# Patient Record
Sex: FEMALE | Race: WHITE | HISPANIC OR LATINO | Employment: UNEMPLOYED | URBAN - METROPOLITAN AREA
[De-identification: names, ages, dates, MRNs, and addresses within clinical notes are randomized per-mention and may not be internally consistent; named-entity substitution may affect disease eponyms.]

---

## 2022-05-11 ENCOUNTER — HOSPITAL ENCOUNTER (EMERGENCY)
Facility: MEDICAL CENTER | Age: 22
End: 2022-05-12
Attending: OBSTETRICS & GYNECOLOGY | Admitting: OBSTETRICS & GYNECOLOGY
Payer: COMMERCIAL

## 2022-05-11 PROCEDURE — 302449 STATCHG TRIAGE ONLY (STATISTIC)

## 2022-05-12 VITALS
RESPIRATION RATE: 18 BRPM | HEART RATE: 88 BPM | BODY MASS INDEX: 39.01 KG/M2 | DIASTOLIC BLOOD PRESSURE: 60 MMHG | HEIGHT: 62 IN | WEIGHT: 212 LBS | TEMPERATURE: 36.4 F | OXYGEN SATURATION: 96 % | SYSTOLIC BLOOD PRESSURE: 97 MMHG

## 2022-05-12 LAB
APPEARANCE UR: CLEAR
COLOR UR AUTO: YELLOW
GLUCOSE UR QL STRIP.AUTO: NEGATIVE MG/DL
KETONES UR QL STRIP.AUTO: NEGATIVE MG/DL
LEUKOCYTE ESTERASE UR QL STRIP.AUTO: ABNORMAL
NITRITE UR QL STRIP.AUTO: NEGATIVE
PH UR STRIP.AUTO: 7 [PH] (ref 5–8)
PROT UR QL STRIP: NEGATIVE MG/DL
RBC UR QL AUTO: NEGATIVE
SP GR UR STRIP.AUTO: 1.02 (ref 1–1.03)

## 2022-05-12 PROCEDURE — 81002 URINALYSIS NONAUTO W/O SCOPE: CPT

## 2022-05-12 NOTE — PROGRESS NOTES
0000: pt to labor and delivery, pt states she had an appointment with dr. Cruz this morning.  Pt states her bp was high, but does not recall the number.  Pt had labs drawn after the appointment.  Pt states she continues to have high blood pressures at home with her machine but cannot recall the numbers.  Pt states she has had a headache since she moved to [place] (11 days).  Pt denies vaginal bleeding or leaking. Pt reports fetal movement.  Vss.  ua done, no protein.     0030: call to dr. Valentin, report given. Orders to discharge home with preeclampsia precautions.  Pt discharged in stable condition.

## 2022-05-12 NOTE — DISCHARGE INSTRUCTIONS
Preeclampsia and Eclampsia  Preeclampsia is a serious condition that may develop during pregnancy. This condition causes high blood pressure and increased protein in your urine along with other symptoms, such as headaches and vision changes. These symptoms may develop as the condition gets worse. Preeclampsia may occur at 20 weeks of pregnancy or later.  Diagnosing and treating preeclampsia early is very important. If not treated early, it can cause serious problems for you and your baby. One problem it can lead to is eclampsia. Eclampsia is a condition that causes muscle jerking or shaking (convulsions or seizures) and other serious problems for the mother. During pregnancy, delivering your baby may be the best treatment for preeclampsia or eclampsia. For most women, preeclampsia and eclampsia symptoms go away after giving birth.  In rare cases, a woman may develop preeclampsia after giving birth (postpartum preeclampsia). This usually occurs within 48 hours after childbirth but may occur up to 6 weeks after giving birth.  What are the causes?  The cause of preeclampsia is not known.  What increases the risk?  The following risk factors make you more likely to develop preeclampsia:  Being pregnant for the first time.  Having had preeclampsia during a past pregnancy.  Having a family history of preeclampsia.  Having high blood pressure.  Being pregnant with more than one baby.  Being 35 or older.  Being -American.  Having kidney disease or diabetes.  Having medical conditions such as lupus or blood diseases.  Being very overweight (obese).  What are the signs or symptoms?  The most common symptoms are:  Severe headaches.  Vision problems, such as blurred or double vision.  Abdominal pain, especially upper abdominal pain.  Other symptoms that may develop as the condition gets worse include:  Sudden weight gain.  Sudden swelling of the hands, face, legs, and feet.  Severe nausea and vomiting.  Numbness in the  face, arms, legs, and feet.  Dizziness.  Urinating less than usual.  Slurred speech.  Convulsions or seizures.  How is this diagnosed?  There are no screening tests for preeclampsia. Your health care provider will ask you about symptoms and check for signs of preeclampsia during your prenatal visits. You may also have tests that include:  Checking your blood pressure.  Urine tests to check for protein. Your health care provider will check for this at every prenatal visit.  Blood tests.  Monitoring your baby's heart rate.  Ultrasound.  How is this treated?  You and your health care provider will determine the treatment approach that is best for you. Treatment may include:  Having more frequent prenatal exams to check for signs of preeclampsia, if you have an increased risk for preeclampsia.  Medicine to lower your blood pressure.  Staying in the hospital, if your condition is severe. There, treatment will focus on controlling your blood pressure and the amount of fluids in your body (fluid retention).  Taking medicine (magnesium sulfate) to prevent seizures. This may be given as an injection or through an IV.  Taking a low-dose aspirin during your pregnancy.  Delivering your baby early. You may have your labor started with medicine (induced), or you may have a  delivery.  Follow these instructions at home:  Eating and drinking    Drink enough fluid to keep your urine pale yellow.  Avoid caffeine.  Lifestyle  Do not use any products that contain nicotine or tobacco, such as cigarettes and e-cigarettes. If you need help quitting, ask your health care provider.  Do not use alcohol or drugs.  Avoid stress as much as possible. Rest and get plenty of sleep.  General instructions  Take over-the-counter and prescription medicines only as told by your health care provider.  When lying down, lie on your left side. This keeps pressure off your major blood vessels.  When sitting or lying down, raise (elevate) your feet.  Try putting some pillows underneath your lower legs.  Exercise regularly. Ask your health care provider what kinds of exercise are best for you.  Keep all follow-up and prenatal visits as told by your health care provider. This is important.  How is this prevented?  There is no known way of preventing preeclampsia or eclampsia from developing. However, to lower your risk of complications and detect problems early:  Get regular prenatal care. Your health care provider may be able to diagnose and treat the condition early.  Maintain a healthy weight. Ask your health care provider for help managing weight gain during pregnancy.  Work with your health care provider to manage any long-term (chronic) health conditions you have, such as diabetes or kidney problems.  You may have tests of your blood pressure and kidney function after giving birth.  Your health care provider may have you take low-dose aspirin during your next pregnancy.  Contact a health care provider if:  You have symptoms that your health care provider told you may require more treatment or monitoring, such as:  Headaches.  Nausea or vomiting.  Abdominal pain.  Dizziness.  Light-headedness.  Get help right away if:  You have severe:  Abdominal pain.  Headaches that do not get better.  Dizziness.  Vision problems.  Confusion.  Nausea or vomiting.  You have any of the following:  A seizure.  Sudden, rapid weight gain.  Sudden swelling in your hands, ankles, or face.  Trouble moving any part of your body.  Numbness in any part of your body.  Trouble speaking.  Abnormal bleeding.  You faint.  Summary  Preeclampsia is a serious condition that may develop during pregnancy.  This condition causes high blood pressure and increased protein in your urine along with other symptoms, such as headaches and vision changes.  Diagnosing and treating preeclampsia early is very important. If not treated early, it can cause serious problems for you and your baby.  Get help  right away if you have symptoms that your health care provider told you to watch for.  This information is not intended to replace advice given to you by your health care provider. Make sure you discuss any questions you have with your health care provider.  Document Released: 12/15/2001 Document Revised: 08/20/2019 Document Reviewed: 07/24/2017  Elsevier Patient Education © 2020 [employer] Inc[employer]

## 2022-07-05 ENCOUNTER — APPOINTMENT (OUTPATIENT)
Dept: RADIOLOGY | Facility: MEDICAL CENTER | Age: 22
End: 2022-07-05
Attending: OBSTETRICS & GYNECOLOGY
Payer: COMMERCIAL

## 2022-07-05 ENCOUNTER — HOSPITAL ENCOUNTER (EMERGENCY)
Facility: MEDICAL CENTER | Age: 22
End: 2022-07-05
Attending: OBSTETRICS & GYNECOLOGY | Admitting: OBSTETRICS & GYNECOLOGY
Payer: COMMERCIAL

## 2022-07-05 VITALS
SYSTOLIC BLOOD PRESSURE: 104 MMHG | DIASTOLIC BLOOD PRESSURE: 63 MMHG | HEART RATE: 107 BPM | OXYGEN SATURATION: 97 % | WEIGHT: 212 LBS | HEIGHT: 62 IN | BODY MASS INDEX: 39.01 KG/M2 | TEMPERATURE: 98.8 F

## 2022-07-05 LAB
ABO GROUP BLD: NORMAL
ALBUMIN SERPL BCP-MCNC: 3.3 G/DL (ref 3.2–4.9)
ALBUMIN/GLOB SERPL: 1 G/DL
ALP SERPL-CCNC: 114 U/L (ref 30–99)
ALT SERPL-CCNC: 14 U/L (ref 2–50)
ANION GAP SERPL CALC-SCNC: 11 MMOL/L (ref 7–16)
APPEARANCE UR: CLEAR
APTT PPP: 24.1 SEC (ref 24.7–36)
AST SERPL-CCNC: 13 U/L (ref 12–45)
BASOPHILS # BLD AUTO: 0.3 % (ref 0–1.8)
BASOPHILS # BLD: 0.03 K/UL (ref 0–0.12)
BILIRUB SERPL-MCNC: 0.2 MG/DL (ref 0.1–1.5)
BILIRUB UR QL STRIP.AUTO: NEGATIVE
BLD GP AB SCN SERPL QL: NORMAL
BUN SERPL-MCNC: 6 MG/DL (ref 8–22)
CALCIUM SERPL-MCNC: 8.8 MG/DL (ref 8.5–10.5)
CHLORIDE SERPL-SCNC: 104 MMOL/L (ref 96–112)
CO2 SERPL-SCNC: 19 MMOL/L (ref 20–33)
COLOR UR: YELLOW
CREAT SERPL-MCNC: 0.35 MG/DL (ref 0.5–1.4)
EOSINOPHIL # BLD AUTO: 0.21 K/UL (ref 0–0.51)
EOSINOPHIL NFR BLD: 2.2 % (ref 0–6.9)
ERYTHROCYTE [DISTWIDTH] IN BLOOD BY AUTOMATED COUNT: 38.5 FL (ref 35.9–50)
FIBRINOGEN PPP-MCNC: 569 MG/DL (ref 215–460)
FLUAV RNA SPEC QL NAA+PROBE: NEGATIVE
FLUBV RNA SPEC QL NAA+PROBE: NEGATIVE
GFR SERPLBLD CREATININE-BSD FMLA CKD-EPI: 148 ML/MIN/1.73 M 2
GLOBULIN SER CALC-MCNC: 3.4 G/DL (ref 1.9–3.5)
GLUCOSE SERPL-MCNC: 95 MG/DL (ref 65–99)
GLUCOSE UR STRIP.AUTO-MCNC: NEGATIVE MG/DL
HCT VFR BLD AUTO: 32.7 % (ref 37–47)
HGB BLD-MCNC: 11.2 G/DL (ref 12–16)
IMM GRANULOCYTES # BLD AUTO: 0.02 K/UL (ref 0–0.11)
IMM GRANULOCYTES NFR BLD AUTO: 0.2 % (ref 0–0.9)
INR PPP: 0.96 (ref 0.87–1.13)
KETONES UR STRIP.AUTO-MCNC: 40 MG/DL
LACTATE SERPL-SCNC: 1.3 MMOL/L (ref 0.5–2)
LEUKOCYTE ESTERASE UR QL STRIP.AUTO: ABNORMAL
LYMPHOCYTES # BLD AUTO: 2.01 K/UL (ref 1–4.8)
LYMPHOCYTES NFR BLD: 21.4 % (ref 22–41)
MCH RBC QN AUTO: 29.2 PG (ref 27–33)
MCHC RBC AUTO-ENTMCNC: 34.3 G/DL (ref 33.6–35)
MCV RBC AUTO: 85.2 FL (ref 81.4–97.8)
MICRO URNS: ABNORMAL
MONOCYTES # BLD AUTO: 0.63 K/UL (ref 0–0.85)
MONOCYTES NFR BLD AUTO: 6.7 % (ref 0–13.4)
NEUTROPHILS # BLD AUTO: 6.5 K/UL (ref 2–7.15)
NEUTROPHILS NFR BLD: 69.2 % (ref 44–72)
NITRITE UR QL STRIP.AUTO: NEGATIVE
NRBC # BLD AUTO: 0 K/UL
NRBC BLD-RTO: 0 /100 WBC
PH UR STRIP.AUTO: 6 [PH] (ref 5–8)
PLATELET # BLD AUTO: 350 K/UL (ref 164–446)
PMV BLD AUTO: 10.5 FL (ref 9–12.9)
POTASSIUM SERPL-SCNC: 3.4 MMOL/L (ref 3.6–5.5)
PROT SERPL-MCNC: 6.7 G/DL (ref 6–8.2)
PROT UR QL STRIP: NEGATIVE MG/DL
PROTHROMBIN TIME: 12.7 SEC (ref 12–14.6)
RBC # BLD AUTO: 3.84 M/UL (ref 4.2–5.4)
RBC # URNS HPF: NORMAL /HPF
RBC UR QL AUTO: NEGATIVE
RH BLD: NORMAL
RSV RNA SPEC QL NAA+PROBE: POSITIVE
SARS-COV-2 RNA RESP QL NAA+PROBE: DETECTED
SODIUM SERPL-SCNC: 134 MMOL/L (ref 135–145)
SP GR UR STRIP.AUTO: 1.02
SPECIMEN SOURCE: ABNORMAL
UROBILINOGEN UR STRIP.AUTO-MCNC: 1 MG/DL
WBC # BLD AUTO: 9.4 K/UL (ref 4.8–10.8)
WBC #/AREA URNS HPF: NORMAL /HPF

## 2022-07-05 PROCEDURE — 85384 FIBRINOGEN ACTIVITY: CPT

## 2022-07-05 PROCEDURE — 81001 URINALYSIS AUTO W/SCOPE: CPT

## 2022-07-05 PROCEDURE — 86900 BLOOD TYPING SEROLOGIC ABO: CPT

## 2022-07-05 PROCEDURE — 0241U HCHG SARS-COV-2 COVID-19 NFCT DS RESP RNA 4 TRGT MIC: CPT

## 2022-07-05 PROCEDURE — 36415 COLL VENOUS BLD VENIPUNCTURE: CPT

## 2022-07-05 PROCEDURE — C9803 HOPD COVID-19 SPEC COLLECT: HCPCS | Performed by: OBSTETRICS & GYNECOLOGY

## 2022-07-05 PROCEDURE — 83605 ASSAY OF LACTIC ACID: CPT

## 2022-07-05 PROCEDURE — 85610 PROTHROMBIN TIME: CPT

## 2022-07-05 PROCEDURE — 700105 HCHG RX REV CODE 258: Performed by: OBSTETRICS & GYNECOLOGY

## 2022-07-05 PROCEDURE — 86850 RBC ANTIBODY SCREEN: CPT

## 2022-07-05 PROCEDURE — 85730 THROMBOPLASTIN TIME PARTIAL: CPT

## 2022-07-05 PROCEDURE — 85025 COMPLETE CBC W/AUTO DIFF WBC: CPT

## 2022-07-05 PROCEDURE — 86901 BLOOD TYPING SEROLOGIC RH(D): CPT

## 2022-07-05 PROCEDURE — 80053 COMPREHEN METABOLIC PANEL: CPT

## 2022-07-05 PROCEDURE — 76819 FETAL BIOPHYS PROFIL W/O NST: CPT

## 2022-07-05 PROCEDURE — 302449 STATCHG TRIAGE ONLY (STATISTIC)

## 2022-07-05 PROCEDURE — 59025 FETAL NON-STRESS TEST: CPT

## 2022-07-05 RX ORDER — SODIUM CHLORIDE, SODIUM LACTATE, POTASSIUM CHLORIDE, CALCIUM CHLORIDE 600; 310; 30; 20 MG/100ML; MG/100ML; MG/100ML; MG/100ML
INJECTION, SOLUTION INTRAVENOUS CONTINUOUS
Status: DISCONTINUED | OUTPATIENT
Start: 2022-07-05 | End: 2022-07-06 | Stop reason: HOSPADM

## 2022-07-05 RX ADMIN — SODIUM CHLORIDE, POTASSIUM CHLORIDE, SODIUM LACTATE AND CALCIUM CHLORIDE: 600; 310; 30; 20 INJECTION, SOLUTION INTRAVENOUS at 18:00

## 2022-07-06 NOTE — PROGRESS NOTES
1900 Report received from DANITZA Son. POC discussed, questions answered.    2200 Per Dr. Lane, if BPP is 8/8 discharge home.    2256 Discharge instructions given including COVID positive and PTL precautions, and when to return to the hospital, Pt verbalizes understanding at this time. All questions answered.    2300 Pt ambulated off the unit with FOB and personal belongings in place.

## 2022-07-06 NOTE — ED PROVIDER NOTES
"Department of Obstetrics and Gynecology  Labor and Delivery History and Physical    Date of Admission: 2022     ID: 22 y.o.  with IUP at 31w6d, CINDY 2022    Primary OB: Zachery Cruz M.D.     Attending OB: Elsa Lane M.D.     CC: feels sick    HPI: Velvet Mota is a 22 y.o.  at 31 weeks and 6 days who presents to labor and delivery with a 3-day history of worsening upper respiratory symptoms.  She has a severe sore throat but is able to tolerate p.o.  She reports a cough and some chest pressure her cough is productive but nonbloody.  She reports nasal congestion.  On day one of her illness she had nausea and diarrhea though both of these symptoms have resolved.  She has had limited p.o. intake today.  She has had shaking chills but has not taken her temperature at home.  She denies contractions loss of fluid vaginal bleeding and reports that the baby is moving well.  She presents today with the father the baby who is feeling well she has no prior history of COVID infection    She is not vaccinated for COVID    ROS: 10 systems reviewed and negative except as noted above.    Obstetric History    - EAb  G2 - Current pregnancy      Past Medical History  Surgical History   Denies   Left toenail removal      Gynecologic History  Social History   Regular menses prior to pregnancy  Denies Hx of abnormal pap smears.  Denies Hx of STIs Tobacco: Denies  EtOH: Denies  Street Drugs: Prior history of marijuana use  Engaged. FOB is Jayme who is at bedside       Medications  Allergies   No current facility-administered medications on file prior to encounter.     No current outpatient medications on file prior to encounter.    Rocephin [ceftriaxone]       O: /63   Pulse (!) 117   Temp 37.1 °C (98.8 °F) (Oral)   Ht 1.575 m (5' 2\")   Wt 96.2 kg (212 lb)   SpO2 95%   BMI 38.78 kg/m²     Gen: NAD, AAO  CV: RRR, no m/r/g  Lungs: CTAB  Abd: Gravid, NTTP,Cephalic by " [place], No rebound or guarding  Ext: NTTP, no edema, 2+DPP  Pelvic: deferred    FHT:  150 with moderate LTV. +accels. Deceleration noted to 120 bpm x 3 min  [place]: occasional     Labs:   Admission on 05/11/2022, Discharged on 05/12/2022   Component Date Value Ref Range Status   • POC Color 05/12/2022 Yellow   Final   • POC Appearance 05/12/2022 Clear   Final   • POC Glucose 05/12/2022 Negative  Negative mg/dL Final   • POC Ketones 05/12/2022 Negative  Negative mg/dL Final   • POC Specific Gravity 05/12/2022 1.020  1.005 - 1.030 Final   • POC Blood 05/12/2022 Negative  Negative Final   • POC Urine PH 05/12/2022 7.0  5.0 - 8.0 Final   • POC Protein 05/12/2022 Negative  Negative mg/dL Final   • POC Nitrites 05/12/2022 Negative  Negative Final   • POC Leukocyte Esterase 05/12/2022 Trace (A) Negative Final        Latest Reference Range & Units 07/05/22 17:55   WBC 4.8 - 10.8 K/uL 9.4   RBC 4.20 - 5.40 M/uL 3.84 (L)   Hemoglobin 12.0 - 16.0 g/dL 11.2 (L)   Hematocrit 37.0 - 47.0 % 32.7 (L)   MCV 81.4 - 97.8 fL 85.2   MCH 27.0 - 33.0 pg 29.2   MCHC 33.6 - 35.0 g/dL 34.3   RDW 35.9 - 50.0 fL 38.5   Platelet Count 164 - 446 K/uL 350   MPV 9.0 - 12.9 fL 10.5   Neutrophils-Polys 44.00 - 72.00 % 69.20   Neutrophils (Absolute) 2.00 - 7.15 K/uL 6.50   Lymphocytes 22.00 - 41.00 % 21.40 (L)   Lymphs (Absolute) 1.00 - 4.80 K/uL 2.01   Monocytes 0.00 - 13.40 % 6.70   Monos (Absolute) 0.00 - 0.85 K/uL 0.63   Eosinophils 0.00 - 6.90 % 2.20   Eos (Absolute) 0.00 - 0.51 K/uL 0.21   Basophils 0.00 - 1.80 % 0.30   Baso (Absolute) 0.00 - 0.12 K/uL 0.03   Immature Granulocytes 0.00 - 0.90 % 0.20   Immature Granulocytes (abs) 0.00 - 0.11 K/uL 0.02   Nucleated RBC /100 WBC 0.00   NRBC (Absolute) K/uL 0.00   Sodium 135 - 145 mmol/L 134 (L)   Potassium 3.6 - 5.5 mmol/L 3.4 (L)   Chloride 96 - 112 mmol/L 104   Co2 20 - 33 mmol/L 19 (L)   Anion Gap 7.0 - 16.0  11.0   Glucose 65 - 99 mg/dL 95   Bun 8 - 22 mg/dL 6 (L)   Creatinine 0.50 - 1.40 mg/dL  0.35 (L)   GFR (CKD-EPI) >60 mL/min/1.73 m 2 148   Calcium 8.5 - 10.5 mg/dL 8.8   AST(SGOT) 12 - 45 U/L 13   ALT(SGPT) 2 - 50 U/L 14   Alkaline Phosphatase 30 - 99 U/L 114 (H)   Total Bilirubin 0.1 - 1.5 mg/dL 0.2   Albumin 3.2 - 4.9 g/dL 3.3   Total Protein 6.0 - 8.2 g/dL 6.7   Globulin 1.9 - 3.5 g/dL 3.4   A-G Ratio g/dL 1.0   Lactic Acid 0.5 - 2.0 mmol/L 1.3   (L): Data is abnormally low  (H): Data is abnormally high         Latest Reference Range & Units 07/05/22 17:55   Influenza virus A RNA Negative  Negative   Influenza virus B, PCR Negative  Negative   RSV, PCR Negative  POSITIVE !   SARS-CoV-2 by PCR  DETECTED !!   SARS-CoV-2 Source  NP Swab   !: Data is abnormal  !!: Data is critical    Imaging:   HISTORY/REASON FOR EXAM:  Abnormal fetal heart rate or rhythm; ISO COVID +/ RSV+        TECHNIQUE/EXAM DESCRIPTION:  Ultrasound for biophysical profile.        COMPARISON:  None     FINDINGS:  Single intrauterine gestation is identified which has a heart rate of 142 bpm.     Amniotic fluid volume is 15.79 cm.           Biophysical profile score is 8  out of 8 (movement 2, tone 2, breathing 2, fluid 2).     IMPRESSION:        1. Normal biophysical profile ultrasound.       A/P: Velvet Mota is a 22 y.o. G G2, P0 at 31 weeks and 6 days here for upper respiratory symptoms currently diagnosed with RSV and COVID  1.  RSV and COVID  -No evidence of hypotension sepsis or preeclampsia  -Oxygen saturation is normal on room air  -Maternal tachycardia has resolved upon admission following IV hydration  -Supportive measures for RSV discussed  -PACs lipid treatment offered and declined  -COVID vaccination recommended  -Start aspirin 81 mg p.o. daily for the remainder of pregnancy  2. Fetal well being   -Reactive NST  -BPP 10/10  -[facility] reviewed    F/u with [place] as planned on 7/11. Covid precautions given. FM precautions given. Isolation guidelines reviewed.     Elsa Lane M.D.,  7/5/2022, 8:14 PM

## 2022-07-06 NOTE — PROGRESS NOTES
"1659- G1 at 31-6 presents with 3 day history of feeling unwell with, increasing in severity today: vomiting (1st day only), chills and sweating, productive cough, sore throat, chest heaviess, SOB, and her hands \"smelling like onions.\" Taken to [facility]. Accompanied by FOB who has no symptoms. Has not been vaccinated for Covid. Denies ctx, leaking, bleeding. Reports +FM. Denies any health or pregnancy problems.    TOCO/EFM placed. Pt appears pale, productive cough noted. Vital signs as noted. O2 sats 93-97%. P 120s-150. Report to Dr. Lane. Orders received.   1755- IV started.   1800- Nasal swab obtained.   1850- Pt reports feeling a little better after IVF bolus. Still feels chest heaviness. Pulse decreasing in rate. No lung secretions noted on ausculation.   1900- Notified from Lab of positive Covid and RSV result. Pt and MD notified. Report to DANITZA Morejon.     "

## 2022-07-06 NOTE — DISCHARGE INSTRUCTIONS
Pre-term Labor (<37 weeks):  Call your physician or return to the hospital if:  You have painless regular contractions more than 4 in one hour.  Your water breaks (remember time and color).  You have menstrual-like cramps, a low dull backache or pressure in your pelvis or back.  Your baby does not move enough to complete the daily kick count (10 movements in 2 hours).  Your baby moves much less often than on the days before or you have not felt your baby move all day.  Please review the MEDICATION LIST section of your AFTER VISIT SUMMARY document.  Take your medication as prescribed    Pregnancy and COVID-19  Coronavirus disease, also called COVID-19, is an infection of the lungs and airways (respiratory tract). It is unclear at this time if pregnancy makes it more likely for you to get COVID-19, or what effects the infection may have on your unborn baby. However, pregnancy causes changes to your heart, lungs, and the body's disease-fighting system (immune system). Some of these changes make it more likely for you to get sick and have more serious illness. Therefore, it is important for you to take precautions in order to protect yourself and your unborn baby.  Work with your health care team to develop a plan to protect yourself from all infections, including COVID-19. This is one way for you to stay healthy during your pregnancy and to keep your baby healthy as well.  How does this affect me?  If you get COVID-19, there is a risk that you may:  Get a respiratory illness that can lead to pneumonia.  Give birth to your baby before 37 weeks of pregnancy (premature birth).  If you have or may have COVID-19, your health care provider may recommend special precautions around your pregnancy. This may affect how you:  Receive care before delivery (prenatal care). How you visit your health care provider may change. Tests and scans may need to be performed differently.  Receive care during labor and delivery. This may  affect your birth plan, including who may be with you during labor and delivery.  Receive care after you deliver your baby (postpartum care). You may stay longer in the hospital, and in a special room. Your baby may also need to stay away from you.  Feed your baby after he or she is born.  Pregnancy can be an especially stressful time because of the changes in your body and the preparation involved in becoming a parent. In addition, you may be feeling especially fearful, anxious, or stressed because of COVID-19 and how it is affecting you.  How does this affect my baby?  It is not known whether a mother will transmit the virus to her unborn baby. There is a risk that if you get COVID-19:  The virus that causes COVID-19 can pass to your baby.  You may have premature birth. Your baby may require more medical care if this happens.  What can I do to lower my risk?    There is no vaccine to help prevent COVID-19. However, there are actions that you can take to protect yourself and others from this virus.  Cleaning and personal hygiene  Wash your hands often with soap and water for at least 20 seconds. If soap and water are not available, use alcohol-based hand .  Avoid touching your mouth, face, eyes, or nose.  Clean and disinfect objects and surfaces that are frequently touched every day. This may include:  Counters and tables.  Doorknobs and light switches.  Sinks and faucets.  Electronics such as phones, remote controls, keyboards, computers, and tablets.  Stay away from others  Stay away from people who are sick, if possible.  Avoid social gatherings and travel.  Stay home as much as possible.  Follow these instructions:  Breastfeeding  It is not known if the virus that causes COVID-19 can pass through breast milk to your baby. You should make a plan for feeding your infant with your family and your health care team.  If you have or may have COVID-19, your health care provider may recommend that you take  precautions while breastfeeding, such as:  Washing your hands before feeding your baby.  Wearing a mask while feeding your baby.  Pumping or expressing breast milk to feed to your baby. If possible, ask someone in your household who is not sick to feed your baby the expressed breast milk.  Wash your hands before touching pump parts.  Wash and disinfect all pump parts after expressing milk. Follow the 's instructions to clean and disinfect all pump parts.  General instructions  If you think you have a COVID-19 infection, contact your health care provider right away. Tell your health care provider that you think you may have a COVID-19 infection.  Follow your health care provider's instructions on taking medicines. Some medicines may be unsafe to take during pregnancy.  Cover your mouth and nose by wearing a mask or other cloth covering over your face when you go out in public.  Find ways to manage stress. These may include:  Using relaxation techniques like meditation and deep breathing.  Getting regular exercise. Most women can continue their usual exercise routine during pregnancy. Ask your health care provider what activities are safe for you.  Seeking support from family, friends, or spiritual resources. If you cannot be together in person, you can still connect by phone calls, texts, video calls, or online messaging.  Spending time doing relaxing activities that you enjoy, like listening to music or reading a good book.  Ask for help if you have counseling or nutritional needs during pregnancy. Your health care provider can offer advice or refer you to resources or specialists who can help you with various needs.  Keep all follow-up visits as told by your health care provider. This is important.  Where to find more information  Centers for Disease Control and Prevention (CDC): www.cdc.gov/coronavirus/2019-ncov/  World Health Organization (WHO):  www.who.int/news-room/q-a-detail/y-n-ow-covid-19-pregnancy-childbirth-and-breastfeeding  American College of Obstetricians and Gynecologists (ACOG): www.acog.org/patient-resources/faqs/pregnancy/coronavirus-pregnancy-and-breastfeeding  Questions to ask your health care team  What should I do if I have COVID-19 symptoms?  How will COVID-19 affect my prenatal care visits, tests and scans, labor and delivery, and postpartum care?  Should I plan to breastfeed my baby?  Where can I find mental health resources?  Where can I find support if I have financial concerns?  Contact a health care provider if:  You have signs and symptoms of infection, including a fever or cough. Tell your health care team that you think you may have a COVID-19 infection.  You have strong emotions, such as sadness or anxiety.  You feel unsafe in your home and need help finding a safe place to live.  You have bloody or watery vaginal discharge or vaginal bleeding.  Get help right away if:  You have signs or symptoms of labor before 37 weeks of pregnancy. These include:  Contractions that are 5 minutes or less apart, or that increase in frequency, intensity, or length.  Sudden, sharp pain in the abdomen or in the lower back.  A gush or trickle of fluid from your vagina.  You have difficulty breathing.  You have chest pain.  These symptoms may represent a serious problem that is an emergency. Do not wait to see if the symptoms will go away. Get medical help right away. Call your local emergency services (911 in the [place] Do not drive yourself to the hospital.  Summary  Coronavirus disease, also called COVID-19, is an infection of the lungs and airways (respiratory tract). It is unclear at this time if pregnancy makes you more susceptible to COVID-19 and what effects it may have on unborn babies.  It is important to take precautions to protect yourself and your developing baby. This includes washing your hands often, avoiding touching your mouth,  face, eyes, or nose, avoiding social gatherings and travel, and staying away from people who are sick.  If you think you have a COVID-19 infection, contact your health care provider right away. Tell your health care provider that you think you may have a COVID-19 infection.  If you have or may have COVID-19, your health care provider may recommend special precautions during your pregnancy, labor and delivery, and after your baby is born.  This information is not intended to replace advice given to you by your health care provider. Make sure you discuss any questions you have with your health care provider.  Document Released: 04/14/2020 Document Revised: 04/14/2020 Document Reviewed: 04/14/2020  Elsevier Patient Education © 2020 [employer]

## 2022-08-23 ENCOUNTER — HOSPITAL ENCOUNTER (EMERGENCY)
Facility: MEDICAL CENTER | Age: 22
End: 2022-08-23
Attending: OBSTETRICS & GYNECOLOGY | Admitting: OBSTETRICS & GYNECOLOGY
Payer: COMMERCIAL

## 2022-08-23 VITALS
HEART RATE: 90 BPM | BODY MASS INDEX: 40.48 KG/M2 | WEIGHT: 220 LBS | HEIGHT: 62 IN | TEMPERATURE: 97.3 F | DIASTOLIC BLOOD PRESSURE: 82 MMHG | RESPIRATION RATE: 17 BRPM | SYSTOLIC BLOOD PRESSURE: 126 MMHG

## 2022-08-23 PROCEDURE — 302449 STATCHG TRIAGE ONLY (STATISTIC)

## 2022-08-23 PROCEDURE — 59025 FETAL NON-STRESS TEST: CPT

## 2022-08-23 ASSESSMENT — FIBROSIS 4 INDEX: FIB4 SCORE: 0.22

## 2022-08-24 NOTE — DISCHARGE INSTRUCTIONS
Fetal Movement Counts  Patient Name: ________________________________________________ Patient Due Date: ____________________  What is a fetal movement count?    A fetal movement count is the number of times that you feel your baby move during a certain amount of time. This may also be called a fetal kick count. A fetal movement count is recommended for every pregnant woman. You may be asked to start counting fetal movements as early as week 28 of your pregnancy.  Pay attention to when your baby is most active. You may notice your baby's sleep and wake cycles. You may also notice things that make your baby move more. You should do a fetal movement count:  When your baby is normally most active.  At the same time each day.  A good time to count movements is while you are resting, after having something to eat and drink.  How do I count fetal movements?  Find a quiet, comfortable area. Sit, or lie down on your side.  Write down the date, the start time and stop time, and the number of movements that you felt between those two times. Take this information with you to your health care visits.  For 2 hours, count kicks, flutters, swishes, rolls, and jabs. You should feel at least 10 movements during 2 hours.  You may stop counting after you have felt 10 movements.  If you do not feel 10 movements in 2 hours, have something to eat and drink. Then, keep resting and counting for 1 hour. If you feel at least 4 movements during that hour, you may stop counting.  Contact a health care provider if:  You feel fewer than 4 movements in 2 hours.  Your baby is not moving like he or she usually does.  Date: ____________ Start time: ____________ Stop time: ____________ Movements: ____________  Date: ____________ Start time: ____________ Stop time: ____________ Movements: ____________  Date: ____________ Start time: ____________ Stop time: ____________ Movements: ____________  Date: ____________ Start time: ____________ Stop time:  ____________ Movements: ____________  Date: ____________ Start time: ____________ Stop time: ____________ Movements: ____________  Date: ____________ Start time: ____________ Stop time: ____________ Movements: ____________  Date: ____________ Start time: ____________ Stop time: ____________ Movements: ____________  Date: ____________ Start time: ____________ Stop time: ____________ Movements: ____________  Date: ____________ Start time: ____________ Stop time: ____________ Movements: ____________  This information is not intended to replace advice given to you by your health care provider. Make sure you discuss any questions you have with your health care provider.  Document Released: 01/17/2008 Document Revised: 01/07/2020 Document Reviewed: 01/26/2017  Elsevier Patient Education © 2020 [employer]

## 2022-08-24 NOTE — PROGRESS NOTES
2315: 38.6 weeks G1 pt presents with complaint of not feeling baby move since last night. Pt denies ctx, LOF or VB. VSS, EFMx2 applied. Moderate variability with accels noted on tracing. RN provides reassurance and instructions on proper movement counts. Pt given ice water.    2352: GRABIEL dickens/Dr. Sotelo via phone regarding patient presentation and complaint. RNST noted. Verbal order received for discharge.

## 2022-08-26 ENCOUNTER — HOSPITAL ENCOUNTER (INPATIENT)
Facility: MEDICAL CENTER | Age: 22
LOS: 5 days | End: 2022-08-31
Attending: OBSTETRICS & GYNECOLOGY | Admitting: OBSTETRICS & GYNECOLOGY
Payer: COMMERCIAL

## 2022-08-26 ENCOUNTER — APPOINTMENT (OUTPATIENT)
Dept: OBGYN | Facility: MEDICAL CENTER | Age: 22
End: 2022-08-26
Attending: OBSTETRICS & GYNECOLOGY
Payer: COMMERCIAL

## 2022-08-26 DIAGNOSIS — G89.18 POSTOPERATIVE PAIN: ICD-10-CM

## 2022-08-26 DIAGNOSIS — Z91.89 BREASTFEEDING PROBLEM: ICD-10-CM

## 2022-08-26 LAB
BASOPHILS # BLD AUTO: 0.3 % (ref 0–1.8)
BASOPHILS # BLD: 0.02 K/UL (ref 0–0.12)
EOSINOPHIL # BLD AUTO: 0.01 K/UL (ref 0–0.51)
EOSINOPHIL NFR BLD: 0.1 % (ref 0–6.9)
ERYTHROCYTE [DISTWIDTH] IN BLOOD BY AUTOMATED COUNT: 40.1 FL (ref 35.9–50)
HCT VFR BLD AUTO: 31.4 % (ref 37–47)
HGB BLD-MCNC: 10.5 G/DL (ref 12–16)
HOLDING TUBE BB 8507: NORMAL
IMM GRANULOCYTES # BLD AUTO: 0.05 K/UL (ref 0–0.11)
IMM GRANULOCYTES NFR BLD AUTO: 0.6 % (ref 0–0.9)
LYMPHOCYTES # BLD AUTO: 2.44 K/UL (ref 1–4.8)
LYMPHOCYTES NFR BLD: 31.1 % (ref 22–41)
MCH RBC QN AUTO: 27.5 PG (ref 27–33)
MCHC RBC AUTO-ENTMCNC: 33.4 G/DL (ref 33.6–35)
MCV RBC AUTO: 82.2 FL (ref 81.4–97.8)
MONOCYTES # BLD AUTO: 0.47 K/UL (ref 0–0.85)
MONOCYTES NFR BLD AUTO: 6 % (ref 0–13.4)
NEUTROPHILS # BLD AUTO: 4.85 K/UL (ref 2–7.15)
NEUTROPHILS NFR BLD: 61.9 % (ref 44–72)
NRBC # BLD AUTO: 0 K/UL
NRBC BLD-RTO: 0 /100 WBC
PLATELET # BLD AUTO: 341 K/UL (ref 164–446)
PMV BLD AUTO: 12.5 FL (ref 9–12.9)
RBC # BLD AUTO: 3.82 M/UL (ref 4.2–5.4)
WBC # BLD AUTO: 7.8 K/UL (ref 4.8–10.8)

## 2022-08-26 PROCEDURE — A9270 NON-COVERED ITEM OR SERVICE: HCPCS | Performed by: OBSTETRICS & GYNECOLOGY

## 2022-08-26 PROCEDURE — 700102 HCHG RX REV CODE 250 W/ 637 OVERRIDE(OP): Performed by: OBSTETRICS & GYNECOLOGY

## 2022-08-26 PROCEDURE — 36415 COLL VENOUS BLD VENIPUNCTURE: CPT

## 2022-08-26 PROCEDURE — 770002 HCHG ROOM/CARE - OB PRIVATE (112)

## 2022-08-26 PROCEDURE — 85025 COMPLETE CBC W/AUTO DIFF WBC: CPT

## 2022-08-26 RX ORDER — SODIUM CHLORIDE, SODIUM LACTATE, POTASSIUM CHLORIDE, CALCIUM CHLORIDE 600; 310; 30; 20 MG/100ML; MG/100ML; MG/100ML; MG/100ML
INJECTION, SOLUTION INTRAVENOUS CONTINUOUS
Status: DISCONTINUED | OUTPATIENT
Start: 2022-08-26 | End: 2022-08-27

## 2022-08-26 RX ORDER — ACETAMINOPHEN 500 MG
1000 TABLET ORAL
Status: COMPLETED | OUTPATIENT
Start: 2022-08-26 | End: 2022-08-27

## 2022-08-26 RX ORDER — TERBUTALINE SULFATE 1 MG/ML
0.25 INJECTION, SOLUTION SUBCUTANEOUS
Status: DISCONTINUED | OUTPATIENT
Start: 2022-08-26 | End: 2022-08-28 | Stop reason: HOSPADM

## 2022-08-26 RX ORDER — IBUPROFEN 800 MG/1
800 TABLET ORAL
Status: DISCONTINUED | OUTPATIENT
Start: 2022-08-26 | End: 2022-08-28 | Stop reason: HOSPADM

## 2022-08-26 RX ORDER — OXYTOCIN 10 [USP'U]/ML
10 INJECTION, SOLUTION INTRAMUSCULAR; INTRAVENOUS
Status: DISCONTINUED | OUTPATIENT
Start: 2022-08-26 | End: 2022-08-28 | Stop reason: HOSPADM

## 2022-08-26 RX ADMIN — MISOPROSTOL 25 MCG: 100 TABLET ORAL at 23:32

## 2022-08-26 ASSESSMENT — PATIENT HEALTH QUESTIONNAIRE - PHQ9
SUM OF ALL RESPONSES TO PHQ9 QUESTIONS 1 AND 2: 0
2. FEELING DOWN, DEPRESSED, IRRITABLE, OR HOPELESS: NOT AT ALL
1. LITTLE INTEREST OR PLEASURE IN DOING THINGS: NOT AT ALL

## 2022-08-26 ASSESSMENT — FIBROSIS 4 INDEX: FIB4 SCORE: 0.22

## 2022-08-26 ASSESSMENT — LIFESTYLE VARIABLES: EVER_SMOKED: NEVER

## 2022-08-26 NOTE — H&P
Labor and Delivery History and Physical    CC: Here for induction of labor    HPI:Velvet Mota is a 22-year-old G1, P0 who presents today at 39 weeks and 2 days for term, elective induction of labor.  She initiated her medical care in [place] and transferred to myself at 24 weeks and 0 days.  She had noninvasive prenatal testing that demonstrated a chromosome structure 46XX.  She had an anatomic scan at 18 weeks in [place] that was normal, and it was repeated here at 27 weeks and growth was appropriate.    All other systems were reviewed and were negative.    PMH: Depression  PSH: Removal of a nail on left toe  OB HX: This is her first pregnancy  Gyn Hx: Denies sexually transmitted infection including herpes simplex virus.  Denies abnormal Pap smears.  Last Pap smear was negative for intraepithelial lesions or malignancies in 2022 with her gynecologist in [place]  SH: Denies tobacco, alcohol, or illicit drug use  Meds: Prenatal vitamins  Allergies: Rocephin      Physical Exam  Gen: NAD  Abd: gravid, non tender  Ext: no edema    SVE:1-2/80-2    Laboratory Evaluation  ABO AB+  GBS negative  GTT: 107  Rubella: Immune  HIV: Neg  RPR: NR  Hep sAg: neg    Assessment:   Term intrauterine pregnancy at 39 weeks 2 days      Plan:  Admit to labor and delivery for induction of labor with misoprostol.  She may have an epidural on demand.  Anticipate vaginal delivery.    Zachery Cruz M.D.     Patient : Allison Tate Age: 65 year old Sex: female   MRN: [id number] Encounter Date: 2/15/2021      History     Chief Complaint   Patient presents with   • Fall   • Knee Pain     HPI  About an hour prior to arrival this 65-year-old lady was in the bathroom and after having a bowel movement she was trying to get up and then she had a syncopal episode.  She does not recall the event she does not know how long she was on the floor but she thinks she struck the right parietal area of the head against the floor and she also injured her low right knee.  She had knee replacement.  She denies any headache she does not know how long the loss of consciousness was.  She denies any focal neurological deficits associated with the episode.  She denies any neck or cervical spine pain no thoracolumbar back pain.  She is not aware of any chest pain chest tightness palpitations nausea vomiting abdominal pain diarrhea genitourinary symptoms she had no systemic symptoms fever chills.  Right knee is diffusely tender pain is dull worse with any movement or palpation not associated with gross deformity or swelling.  She has some right proximal thigh bruising which she thinks is “old” she is on Plavix.  No Known Allergies    Current Discharge Medication List      Prior to Admission Medications    Details   traZODone (DESYREL) 100 MG tablet Take 1 tablet by mouth nightly.  Qty: 90 tablet, Refills: 1      atorvastatin (LIPITOR) 40 MG tablet Take 1 tablet by mouth daily.  Qty: 90 tablet, Refills: 3      escitalopram (LEXAPRO) 20 MG tablet Take 1 tablet by mouth daily.  Qty: 90 tablet, Refills: 3      clopidogrel (PLAVIX) 75 MG tablet Take 1 tablet by mouth daily.  Qty: 90 tablet, Refills: 3      lamotrigine (LAMICTAL) 200 MG tablet Take 1 tablet by mouth 3 times daily.  Qty: 270 tablet, Refills: 3      potassium CHLORIDE (KLOR-CON M) 20 MEQ ana maría ER tablet TAKE 1 TABLET BY MOUTH  TWICE DAILY  Qty: 180 tablet, Refills: 0       Glucos-Chondroit-Hyaluron-MSM (GLUCOSAMINE CHONDROITIN JOINT PO)       loperamide (IMODIUM) 2 MG capsule 1-2 tablets 3 times daily as needed for diarrhea.  Qty: 180 capsule, Refills: 11      calcium carbonate-vitamin D (CALTRATE+D) 600-400 MG-UNIT per tablet Take 1 tablet by mouth daily.      MULTIPLE VITAMIN PO Take 1 tablet by mouth daily.      ascorbic acid (VITAMIN C) 1000 MG tablet Take 1,000 mg by mouth daily.      Inulin (FIBER CHOICE PO) Take  by mouth daily.      vitamin B-12 (CYANOCOBALAMIN) 100 MCG tablet Take 100 mcg by mouth daily.      Omega-3 Fatty Acids (FISH OIL) 1200 MG capsule Take 1,200 mg by mouth daily.             Past Medical History:   Diagnosis Date   • Arm fracture, right 02/26/2019   • Carotid artery disease (CMS/HCC)    • Cerebrovascular disease     x3   • CTS (carpal tunnel syndrome)    • Depression    • Epilepsy, psychomotor (CMS/[facility])     r/t strokes   • Essential (primary) hypertension    • Hyperlipidemia    • IFG (impaired fasting glucose)    • Mood disorder (CMS/[facility])    • Personal history of transient ischemic attack (TIA) and cerebral infarction without residual deficit    • Wrist fracture, closed, right, sequela 01/05/2020       Past Surgical History:   Procedure Laterality Date   • BLADDER SURGERY      x2   • CAROTID ARTERY ANGIOPLASTY     • CARPAL TUNNEL RELEASE Left 08/20/2019   • COLONOSCOPY  10.21.14    Dr. Montes De Oca @ [place]; unremarkable   • COLONOSCOPY DIAGNOSTIC  11/26/2014    Colonoscopy, Dx   • GASTRIC BYPASS     • JOINT REPLACEMENT      Knee Replacement.   • REOPER, CAROTID ENDARTEC>1 MON     • TONSILLECTOMY AND ADENOIDECTOMY         Family History   Problem Relation Age of Onset   • Heart disease Father    • Cancer, Lung Father 69   • Heart disease Sister    • Diabetes Sister 46   • Myocardial Infarction Brother    • Patient is unaware of any medical problems Mother    • Patient is unaware of any medical problems Brother    • NEGATIVE FAMILY HX OF Other         Colon  Cancer, IBD       Social History     Tobacco Use   • Smoking status: Current Every Day Smoker     Packs/day: 0.25     Years: 35.00     Pack years: 8.75     Types: Cigarettes     Start date: 1970   • Smokeless tobacco: Never Used   Substance Use Topics   • Alcohol use: Yes     Alcohol/week: 1.0 - 2.0 standard drinks     Types: 1 - 2 Glasses of wine per week     Frequency: 2-4 times a month     Drinks per session: 1 or 2   • Drug use: No       E-cigarette/Vaping   • E-Cigarette/Vaping Use Never Used    • Passive Exposure No    • Counseling Given No      E-Cigarette/Vaping Substances & Devices       Review of Systems   All other systems reviewed and are negative.      Physical Exam     ED Triage Vitals [02/15/21 2229]   ED Triage Vitals Group      Temp 98.4 °F (36.9 °C)      Heart Rate (!) 48      Resp 16      /64      SpO2 97 %      EtCO2 mmHg       Height 5' 9\" (1.753 m)      Weight 153 lb 7 oz (69.6 kg)      Weight Scale Used Scale in bed      BMI (Calculated) 22.66      IBW/kg (Calculated) 66.2       Physical Exam   Constitutional: She is oriented to person, place, and time. No distress.   HENT:   Head: Normocephalic.   Nose: Nose normal.   Mouth/Throat: Oropharynx is clear and moist. No oropharyngeal exudate.   There is no scalp tenderness bruises abrasions or lacerations no depressed skull fractures no Rowan sign no hemotympanum no CSF leak no epistaxis no intraoral lesions or facial tenderness overall normocephalic atraumatic.  Patient pointed to the right posterior parietal area but this area is not tender.   Eyes: Pupils are equal, round, and reactive to light. Right eye exhibits no discharge. Left eye exhibits no discharge. No scleral icterus.   Neck: Neck supple. No JVD present.   Cardiovascular: Normal rate, regular rhythm, normal heart sounds and intact distal pulses. Exam reveals no gallop and no friction rub.   No murmur heard.  Pulmonary/Chest: Effort normal and breath sounds normal. No  respiratory distress. She has no wheezes. She has no rales. She exhibits no tenderness.   Abdominal: Bowel sounds are normal. She exhibits no distension. There is no abdominal tenderness. There is no rebound and no guarding.   Musculoskeletal:         General: Tenderness present. No deformity.        Legs:       Comments: Right knee is diffusely tender but no gross deformities neurovascular examination distally is normal there is right proximal thigh bruising which the patient reports is old otherwise musculoskeletal screening revealed no deformities bruises restriction of movement lacerations abrasions or contusions.   Lymphadenopathy:     She has no cervical adenopathy.   Neurological: She is alert and oriented to person, place, and time. No cranial nerve deficit.   Skin: She is not diaphoretic.   Nursing note and vitals reviewed.      ED Course     Procedures    Lab Results     Results for orders placed or performed during the hospital encounter of 02/15/21   Comprehensive Metabolic Panel   Result Value Ref Range    Fasting Status      Sodium 140 135 - 145 mmol/L    Potassium 4.3 3.4 - 5.1 mmol/L    Chloride 107 98 - 107 mmol/L    Carbon Dioxide 20 (L) 21 - 32 mmol/L    Anion Gap 17 10 - 20 mmol/L    Glucose 100 (H) 65 - 99 mg/dL    BUN 26 (H) 6 - 20 mg/dL    Creatinine 0.58 0.51 - 0.95 mg/dL    Glomerular Filtration Rate >90 >90 mL/min/1.73m2    BUN/ Creatinine Ratio 45 (H) 7 - 25    Calcium 8.9 8.4 - 10.2 mg/dL    Bilirubin, Total 0.4 0.2 - 1.0 mg/dL    GOT/AST 19 <=37 Units/L    GPT/ALT 24 <64 Units/L    Alkaline Phosphatase 79 45 - 117 Units/L    Albumin 3.8 3.6 - 5.1 g/dL    Protein, Total 6.8 6.4 - 8.2 g/dL    Globulin 3.0 2.0 - 4.0 g/dL    A/G Ratio 1.3 1.0 - 2.4   Troponin I Ultra Sensitive   Result Value Ref Range    Troponin I, Ultra Sensitive <0.02 <=0.04 ng/mL   Prothrombin Time   Result Value Ref Range    Prothrombin Time 10.3 9.7 - 11.8 sec    INR 1.0 <=5.0   Partial Thromboplastin Time   Result  Value Ref Range    PTT 23 22 - 30 sec   Magnesium   Result Value Ref Range    Magnesium 1.8 1.7 - 2.4 mg/dL   Urinalysis & Reflex Microscopy With Culture If Indicated   Result Value Ref Range    COLOR, URINALYSIS Yellow     APPEARANCE, URINALYSIS Hazy     GLUCOSE, URINALYSIS Negative Negative mg/dL    BILIRUBIN, URINALYSIS Negative Negative    KETONES, URINALYSIS 15   (A) Negative mg/dL    SPECIFIC GRAVITY, URINALYSIS >1.030 (H) 1.005 - 1.030    OCCULT BLOOD, URINALYSIS Large (A) Negative    PH, URINALYSIS 5.0 5.0 - 7.0    PROTEIN, URINALYSIS Negative Negative mg/dL    UROBILINOGEN, URINALYSIS 0.2 0.2, 1.0 mg/dL    NITRITE, URINALYSIS Negative Negative    LEUKOCYTE ESTERASE, URINALYSIS Negative Negative    SQUAMOUS EPITHELIAL, URINALYSIS 6 to 10 (A) None Seen, 1 to 5 /hpf    ERYTHROCYTES, URINALYSIS 11 to 25 (A) None Seen, 1 to 2 /hpf    LEUKOCYTES, URINALYSIS None Seen None Seen, 1 to 5 /hpf    BACTERIA, URINALYSIS Few (A) None Seen /hpf    HYALINE CASTS, URINALYSIS 6 to 10 (A) None Seen, 1 to 5 /lpf   CBC with Automated Differential (performable only)   Result Value Ref Range    WBC 7.5 4.2 - 11.0 K/mcL    RBC 4.07 4.00 - 5.20 mil/mcL    HGB 13.1 12.0 - 15.5 g/dL    HCT 40.5 36.0 - 46.5 %    MCV 99.5 78.0 - 100.0 fl    MCH 32.2 26.0 - 34.0 pg    MCHC 32.3 32.0 - 36.5 g/dL    RDW-CV 12.9 11.0 - 15.0 %    RDW-SD 47.2 39.0 - 50.0 fL     140 - 450 K/mcL    NRBC 0 <=0 /100 WBC    Neutrophil, Percent 75 %    Lymphocytes, Percent 14 %    Mono, Percent 7 %    Eosinophils, Percent 2 %    Basophils, Percent 1 %    Immature Granulocytes 1 %    Absolute Neutrophils 5.6 1.8 - 7.7 K/mcL    Absolute Lymphocytes 1.1 1.0 - 4.0 K/mcL    Absolute Monocytes 0.6 0.3 - 0.9 K/mcL    Absolute Eosinophils  0.2 0.0 - 0.5 K/mcL    Absolute Basophils 0.1 0.0 - 0.3 K/mcL    Absolute Immmature Granulocytes 0.0 0.0 - 0.2 K/mcL       EKG Results     Diagnosis syncope.  Sinus bradycardia ventricular rate 49 no ectopy no pauses.  P.r.  interval 176 milliseconds.  QRS duration 82 milliseconds.   milliseconds.  There is poor R-wave progression in septal leads but no acute ST-T abnormalities.  Impression abnormal EKG.    EKG tracing interpreted by ED physician    Radiology Results     Imaging Results          XR Chest AP or PA (Final result)  Result time 02/15/21 23:16:41    Final result                 Impression:    FINDINGS/IMPRESSION:    CHEST:    No pleural effusions, pneumothorax or focal consolidation. Cardiac  mediastinal silhouette, pulmonary vasculature and visualized bony thorax is  unremarkable.        Knee:    Uncomplicated total knee arthroplasty.    No acute fracture or malalignment.    Moderate suprapatellar joint effusion. Atherosclerotic vascular  calcifications.                     Narrative:    XR KNEE 4+ VW RIGHT, XR CHEST AP OR PA    Indication: fall, prostetic knee    COMPARISON:  August 16, 2019.                               XR Knee 4+ View Right (Final result)  Result time 02/15/21 23:16:41    Final result                 Impression:    FINDINGS/IMPRESSION:    CHEST:    No pleural effusions, pneumothorax or focal consolidation. Cardiac  mediastinal silhouette, pulmonary vasculature and visualized bony thorax is  unremarkable.        Knee:    Uncomplicated total knee arthroplasty.    No acute fracture or malalignment.    Moderate suprapatellar joint effusion. Atherosclerotic vascular  calcifications.                     Narrative:    XR KNEE 4+ VW RIGHT, XR CHEST AP OR PA    Indication: fall, prostetic knee    COMPARISON:  August 16, 2019.                                ED Medication Orders (From admission, onward)    Ordered Start     Status Ordering Provider    02/15/21 2343 02/15/21 2344  aspirin chewable 324 mg  ONCE      Acknowledged ARTURO BARGER    02/15/21 2327 02/15/21 2328  ketorolac injection 30 mg  ONCE      Last MAR action: Given ARTURO BARGER               [facility]   Multiple diagnosis were considered  including , but not limited to; cardiogenic syncope, CVA, orthostatic syncope, vasovagal syncope, cardiac arrhythmia due to myocardial infarction. The patient's risk and complexity of the case does NOT allow for safe discharge home and require further IN HOSPITAL care and diagnostic work up in attempt to prevent life threatening deterioration. The initial ER treatment and diagnostic work up will need be re-evaluated and modified as needed during hospitalization to lower the risk of of deterioration and/or life threatening complications.The patient/guardian/surrogate was made aware of the above and expressed consent.     Clinical Impression     ED Diagnosis   1. Syncope, unspecified syncope type     2. Bradycardia by electrocardiogram     3. Dehydration     4. Sprain of right knee, unspecified ligament, initial encounter         Disposition        Admit 2/15/2021 11:46 PM  Telemetry Bed?: Yes  Patient Class: Observation [4]  Level of Care: Acute [1]  Admission Diagnosis: Syncope [936004]  Admitting Physician: NICKY ARAUJO [[id number]]  Is this a telephone or verbal order?: This is a telephone order from the admitting physician                     Jt Toth MD  02/15/21 1233

## 2022-08-27 ENCOUNTER — ANESTHESIA (OUTPATIENT)
Dept: OBGYN | Facility: MEDICAL CENTER | Age: 22
End: 2022-08-27
Payer: COMMERCIAL

## 2022-08-27 ENCOUNTER — ANESTHESIA EVENT (OUTPATIENT)
Dept: OBGYN | Facility: MEDICAL CENTER | Age: 22
End: 2022-08-27
Payer: COMMERCIAL

## 2022-08-27 LAB
ALBUMIN SERPL BCP-MCNC: 3.5 G/DL (ref 3.2–4.9)
ALBUMIN/GLOB SERPL: 1 G/DL
ALP SERPL-CCNC: 172 U/L (ref 30–99)
ALT SERPL-CCNC: 7 U/L (ref 2–50)
ANION GAP SERPL CALC-SCNC: 13 MMOL/L (ref 7–16)
AST SERPL-CCNC: 15 U/L (ref 12–45)
BILIRUB SERPL-MCNC: <0.2 MG/DL (ref 0.1–1.5)
BUN SERPL-MCNC: 11 MG/DL (ref 8–22)
CALCIUM SERPL-MCNC: 9 MG/DL (ref 8.5–10.5)
CHLORIDE SERPL-SCNC: 106 MMOL/L (ref 96–112)
CO2 SERPL-SCNC: 18 MMOL/L (ref 20–33)
CREAT SERPL-MCNC: 0.61 MG/DL (ref 0.5–1.4)
CREAT UR-MCNC: 110.46 MG/DL
GFR SERPLBLD CREATININE-BSD FMLA CKD-EPI: 129 ML/MIN/1.73 M 2
GLOBULIN SER CALC-MCNC: 3.4 G/DL (ref 1.9–3.5)
GLUCOSE SERPL-MCNC: 80 MG/DL (ref 65–99)
POTASSIUM SERPL-SCNC: 4 MMOL/L (ref 3.6–5.5)
PROT SERPL-MCNC: 6.9 G/DL (ref 6–8.2)
PROT UR-MCNC: 41 MG/DL (ref 0–15)
PROT/CREAT UR: 371 MG/G (ref 10–107)
SODIUM SERPL-SCNC: 137 MMOL/L (ref 135–145)
URATE SERPL-MCNC: 6.9 MG/DL (ref 1.9–8.2)

## 2022-08-27 PROCEDURE — 700105 HCHG RX REV CODE 258: Performed by: OBSTETRICS & GYNECOLOGY

## 2022-08-27 PROCEDURE — 700111 HCHG RX REV CODE 636 W/ 250 OVERRIDE (IP): Performed by: OBSTETRICS & GYNECOLOGY

## 2022-08-27 PROCEDURE — 160002 HCHG RECOVERY MINUTES (STAT): Performed by: OBSTETRICS & GYNECOLOGY

## 2022-08-27 PROCEDURE — A9270 NON-COVERED ITEM OR SERVICE: HCPCS | Performed by: OBSTETRICS & GYNECOLOGY

## 2022-08-27 PROCEDURE — 59514 CESAREAN DELIVERY ONLY: CPT | Mod: 80 | Performed by: OBSTETRICS & GYNECOLOGY

## 2022-08-27 PROCEDURE — 10H07YZ INSERTION OF OTHER DEVICE INTO PRODUCTS OF CONCEPTION, VIA NATURAL OR ARTIFICIAL OPENING: ICD-10-PCS | Performed by: OBSTETRICS & GYNECOLOGY

## 2022-08-27 PROCEDURE — 700105 HCHG RX REV CODE 258: Performed by: ANESTHESIOLOGY

## 2022-08-27 PROCEDURE — 700111 HCHG RX REV CODE 636 W/ 250 OVERRIDE (IP)

## 2022-08-27 PROCEDURE — 01967 NEURAXL LBR ANES VAG DLVR: CPT | Performed by: ANESTHESIOLOGY

## 2022-08-27 PROCEDURE — 700111 HCHG RX REV CODE 636 W/ 250 OVERRIDE (IP): Performed by: ANESTHESIOLOGY

## 2022-08-27 PROCEDURE — 160041 HCHG SURGERY MINUTES - EA ADDL 1 MIN LEVEL 4: Performed by: OBSTETRICS & GYNECOLOGY

## 2022-08-27 PROCEDURE — 303615 HCHG EPIDURAL/SPINAL ANESTHESIA FOR LABOR

## 2022-08-27 PROCEDURE — 160048 HCHG OR STATISTICAL LEVEL 1-5: Performed by: OBSTETRICS & GYNECOLOGY

## 2022-08-27 PROCEDURE — 84156 ASSAY OF PROTEIN URINE: CPT

## 2022-08-27 PROCEDURE — 700102 HCHG RX REV CODE 250 W/ 637 OVERRIDE(OP): Performed by: OBSTETRICS & GYNECOLOGY

## 2022-08-27 PROCEDURE — 160009 HCHG ANES TIME/MIN: Performed by: OBSTETRICS & GYNECOLOGY

## 2022-08-27 PROCEDURE — 160035 HCHG PACU - 1ST 60 MINS PHASE I: Performed by: OBSTETRICS & GYNECOLOGY

## 2022-08-27 PROCEDURE — 3E033VJ INTRODUCTION OF OTHER HORMONE INTO PERIPHERAL VEIN, PERCUTANEOUS APPROACH: ICD-10-PCS | Performed by: OBSTETRICS & GYNECOLOGY

## 2022-08-27 PROCEDURE — 700101 HCHG RX REV CODE 250: Performed by: ANESTHESIOLOGY

## 2022-08-27 PROCEDURE — 700101 HCHG RX REV CODE 250: Performed by: OBSTETRICS & GYNECOLOGY

## 2022-08-27 PROCEDURE — 82570 ASSAY OF URINE CREATININE: CPT

## 2022-08-27 PROCEDURE — 770002 HCHG ROOM/CARE - OB PRIVATE (112)

## 2022-08-27 PROCEDURE — 36415 COLL VENOUS BLD VENIPUNCTURE: CPT

## 2022-08-27 PROCEDURE — 84550 ASSAY OF BLOOD/URIC ACID: CPT

## 2022-08-27 PROCEDURE — 01968 ANES/ANALG CS DLVR NEURAXIAL: CPT | Performed by: ANESTHESIOLOGY

## 2022-08-27 PROCEDURE — 10907ZC DRAINAGE OF AMNIOTIC FLUID, THERAPEUTIC FROM PRODUCTS OF CONCEPTION, VIA NATURAL OR ARTIFICIAL OPENING: ICD-10-PCS | Performed by: OBSTETRICS & GYNECOLOGY

## 2022-08-27 PROCEDURE — C1755 CATHETER, INTRASPINAL: HCPCS | Performed by: OBSTETRICS & GYNECOLOGY

## 2022-08-27 PROCEDURE — 160029 HCHG SURGERY MINUTES - 1ST 30 MINS LEVEL 4: Performed by: OBSTETRICS & GYNECOLOGY

## 2022-08-27 PROCEDURE — 80053 COMPREHEN METABOLIC PANEL: CPT

## 2022-08-27 RX ORDER — SODIUM CHLORIDE, SODIUM LACTATE, POTASSIUM CHLORIDE, CALCIUM CHLORIDE 600; 310; 30; 20 MG/100ML; MG/100ML; MG/100ML; MG/100ML
INJECTION, SOLUTION INTRAVENOUS CONTINUOUS
Status: DISCONTINUED | OUTPATIENT
Start: 2022-08-28 | End: 2022-08-31 | Stop reason: HOSPADM

## 2022-08-27 RX ORDER — BUPIVACAINE HYDROCHLORIDE 2.5 MG/ML
INJECTION, SOLUTION EPIDURAL; INFILTRATION; INTRACAUDAL
Status: COMPLETED | OUTPATIENT
Start: 2022-08-27 | End: 2022-08-27

## 2022-08-27 RX ORDER — PHENYLEPHRINE HYDROCHLORIDE 10 MG/ML
INJECTION, SOLUTION INTRAMUSCULAR; INTRAVENOUS; SUBCUTANEOUS PRN
Status: DISCONTINUED | OUTPATIENT
Start: 2022-08-27 | End: 2022-08-27 | Stop reason: SURG

## 2022-08-27 RX ORDER — METOCLOPRAMIDE HYDROCHLORIDE 5 MG/ML
10 INJECTION INTRAMUSCULAR; INTRAVENOUS ONCE
Status: COMPLETED | OUTPATIENT
Start: 2022-08-27 | End: 2022-08-27

## 2022-08-27 RX ORDER — SODIUM CHLORIDE, SODIUM LACTATE, POTASSIUM CHLORIDE, AND CALCIUM CHLORIDE .6; .31; .03; .02 G/100ML; G/100ML; G/100ML; G/100ML
250 INJECTION, SOLUTION INTRAVENOUS PRN
Status: DISCONTINUED | OUTPATIENT
Start: 2022-08-27 | End: 2022-08-27

## 2022-08-27 RX ORDER — DIPHENHYDRAMINE HYDROCHLORIDE 50 MG/ML
25 INJECTION INTRAMUSCULAR; INTRAVENOUS ONCE
Status: COMPLETED | OUTPATIENT
Start: 2022-08-27 | End: 2022-08-27

## 2022-08-27 RX ORDER — DEXTROSE, SODIUM CHLORIDE, SODIUM LACTATE, POTASSIUM CHLORIDE, AND CALCIUM CHLORIDE 5; .6; .31; .03; .02 G/100ML; G/100ML; G/100ML; G/100ML; G/100ML
INJECTION, SOLUTION INTRAVENOUS CONTINUOUS
Status: DISCONTINUED | OUTPATIENT
Start: 2022-08-27 | End: 2022-08-31 | Stop reason: HOSPADM

## 2022-08-27 RX ORDER — SODIUM CHLORIDE, SODIUM GLUCONATE, SODIUM ACETATE, POTASSIUM CHLORIDE AND MAGNESIUM CHLORIDE 526; 502; 368; 37; 30 MG/100ML; MG/100ML; MG/100ML; MG/100ML; MG/100ML
INJECTION, SOLUTION INTRAVENOUS
Status: DISCONTINUED | OUTPATIENT
Start: 2022-08-27 | End: 2022-08-27 | Stop reason: SURG

## 2022-08-27 RX ORDER — ROPIVACAINE HYDROCHLORIDE 2 MG/ML
INJECTION, SOLUTION EPIDURAL; INFILTRATION; PERINEURAL
Status: COMPLETED
Start: 2022-08-27 | End: 2022-08-27

## 2022-08-27 RX ORDER — MIDAZOLAM HYDROCHLORIDE 1 MG/ML
1 INJECTION INTRAMUSCULAR; INTRAVENOUS
Status: DISCONTINUED | OUTPATIENT
Start: 2022-08-27 | End: 2022-08-28 | Stop reason: HOSPADM

## 2022-08-27 RX ORDER — DIPHENHYDRAMINE HYDROCHLORIDE 50 MG/ML
25 INJECTION INTRAMUSCULAR; INTRAVENOUS EVERY 6 HOURS PRN
Status: DISPENSED | OUTPATIENT
Start: 2022-08-27 | End: 2022-08-28

## 2022-08-27 RX ORDER — ALBUTEROL SULFATE 2.5 MG/3ML
2.5 SOLUTION RESPIRATORY (INHALATION)
Status: DISCONTINUED | OUTPATIENT
Start: 2022-08-27 | End: 2022-08-28 | Stop reason: HOSPADM

## 2022-08-27 RX ORDER — DIPHENHYDRAMINE HYDROCHLORIDE 50 MG/ML
12.5 INJECTION INTRAMUSCULAR; INTRAVENOUS EVERY 6 HOURS PRN
Status: DISPENSED | OUTPATIENT
Start: 2022-08-27 | End: 2022-08-28

## 2022-08-27 RX ORDER — DIPHENHYDRAMINE HYDROCHLORIDE 50 MG/ML
12.5 INJECTION INTRAMUSCULAR; INTRAVENOUS EVERY 6 HOURS PRN
Status: ACTIVE | OUTPATIENT
Start: 2022-08-27 | End: 2022-08-28

## 2022-08-27 RX ORDER — SODIUM CHLORIDE, SODIUM GLUCONATE, SODIUM ACETATE, POTASSIUM CHLORIDE AND MAGNESIUM CHLORIDE 526; 502; 368; 37; 30 MG/100ML; MG/100ML; MG/100ML; MG/100ML; MG/100ML
1000 INJECTION, SOLUTION INTRAVENOUS ONCE
Status: COMPLETED | OUTPATIENT
Start: 2022-08-27 | End: 2022-08-27

## 2022-08-27 RX ORDER — HALOPERIDOL 5 MG/ML
1 INJECTION INTRAMUSCULAR
Status: DISCONTINUED | OUTPATIENT
Start: 2022-08-27 | End: 2022-08-28 | Stop reason: HOSPADM

## 2022-08-27 RX ORDER — SODIUM CHLORIDE, SODIUM LACTATE, POTASSIUM CHLORIDE, AND CALCIUM CHLORIDE .6; .31; .03; .02 G/100ML; G/100ML; G/100ML; G/100ML
1000 INJECTION, SOLUTION INTRAVENOUS
Status: COMPLETED | OUTPATIENT
Start: 2022-08-27 | End: 2022-08-27

## 2022-08-27 RX ORDER — ROPIVACAINE HYDROCHLORIDE 2 MG/ML
INJECTION, SOLUTION EPIDURAL; INFILTRATION; PERINEURAL CONTINUOUS
Status: DISCONTINUED | OUTPATIENT
Start: 2022-08-27 | End: 2022-08-27

## 2022-08-27 RX ORDER — DIPHENHYDRAMINE HYDROCHLORIDE 50 MG/ML
INJECTION INTRAMUSCULAR; INTRAVENOUS
Status: COMPLETED
Start: 2022-08-27 | End: 2022-08-28

## 2022-08-27 RX ORDER — ACETAMINOPHEN 500 MG
1000 TABLET ORAL EVERY 6 HOURS
Status: DISPENSED | OUTPATIENT
Start: 2022-08-28 | End: 2022-08-29

## 2022-08-27 RX ORDER — DIPHENHYDRAMINE HYDROCHLORIDE 50 MG/ML
12.5 INJECTION INTRAMUSCULAR; INTRAVENOUS
Status: DISCONTINUED | OUTPATIENT
Start: 2022-08-27 | End: 2022-08-28 | Stop reason: HOSPADM

## 2022-08-27 RX ORDER — CLINDAMYCIN PHOSPHATE 900 MG/50ML
900 INJECTION, SOLUTION INTRAVENOUS ONCE
Status: COMPLETED | OUTPATIENT
Start: 2022-08-27 | End: 2022-08-27

## 2022-08-27 RX ORDER — CITRIC ACID/SODIUM CITRATE 334-500MG
30 SOLUTION, ORAL ORAL ONCE
Status: COMPLETED | OUTPATIENT
Start: 2022-08-27 | End: 2022-08-27

## 2022-08-27 RX ORDER — MEPERIDINE HYDROCHLORIDE 25 MG/ML
12.5 INJECTION INTRAMUSCULAR; INTRAVENOUS; SUBCUTANEOUS
Status: DISCONTINUED | OUTPATIENT
Start: 2022-08-27 | End: 2022-08-28 | Stop reason: HOSPADM

## 2022-08-27 RX ORDER — MORPHINE SULFATE 0.5 MG/ML
INJECTION, SOLUTION EPIDURAL; INTRATHECAL; INTRAVENOUS PRN
Status: DISCONTINUED | OUTPATIENT
Start: 2022-08-27 | End: 2022-08-27 | Stop reason: SURG

## 2022-08-27 RX ORDER — AZITHROMYCIN 500 MG/5ML
500 INJECTION, POWDER, LYOPHILIZED, FOR SOLUTION INTRAVENOUS ONCE
Status: COMPLETED | OUTPATIENT
Start: 2022-08-27 | End: 2022-08-27

## 2022-08-27 RX ORDER — OXYCODONE HYDROCHLORIDE 10 MG/1
10 TABLET ORAL EVERY 4 HOURS PRN
Status: ACTIVE | OUTPATIENT
Start: 2022-08-27 | End: 2022-08-28

## 2022-08-27 RX ORDER — LIDOCAINE HYDROCHLORIDE AND EPINEPHRINE 15; 5 MG/ML; UG/ML
INJECTION, SOLUTION EPIDURAL
Status: COMPLETED | OUTPATIENT
Start: 2022-08-27 | End: 2022-08-27

## 2022-08-27 RX ORDER — OXYCODONE HCL 5 MG/5 ML
5 SOLUTION, ORAL ORAL
Status: DISCONTINUED | OUTPATIENT
Start: 2022-08-27 | End: 2022-08-28 | Stop reason: HOSPADM

## 2022-08-27 RX ORDER — ONDANSETRON 2 MG/ML
4 INJECTION INTRAMUSCULAR; INTRAVENOUS
Status: DISCONTINUED | OUTPATIENT
Start: 2022-08-27 | End: 2022-08-28 | Stop reason: HOSPADM

## 2022-08-27 RX ORDER — HYDROMORPHONE HYDROCHLORIDE 1 MG/ML
0.4 INJECTION, SOLUTION INTRAMUSCULAR; INTRAVENOUS; SUBCUTANEOUS
Status: ACTIVE | OUTPATIENT
Start: 2022-08-27 | End: 2022-08-28

## 2022-08-27 RX ORDER — BUPIVACAINE HYDROCHLORIDE 2.5 MG/ML
INJECTION, SOLUTION EPIDURAL; INFILTRATION; INTRACAUDAL
Status: COMPLETED
Start: 2022-08-27 | End: 2022-08-27

## 2022-08-27 RX ORDER — OXYTOCIN 10 [USP'U]/ML
INJECTION, SOLUTION INTRAMUSCULAR; INTRAVENOUS PRN
Status: DISCONTINUED | OUTPATIENT
Start: 2022-08-27 | End: 2022-08-27 | Stop reason: SURG

## 2022-08-27 RX ORDER — OXYCODONE HCL 5 MG/5 ML
10 SOLUTION, ORAL ORAL
Status: DISCONTINUED | OUTPATIENT
Start: 2022-08-27 | End: 2022-08-28 | Stop reason: HOSPADM

## 2022-08-27 RX ORDER — HYDROMORPHONE HYDROCHLORIDE 1 MG/ML
0.2 INJECTION, SOLUTION INTRAMUSCULAR; INTRAVENOUS; SUBCUTANEOUS
Status: ACTIVE | OUTPATIENT
Start: 2022-08-27 | End: 2022-08-28

## 2022-08-27 RX ORDER — KETOROLAC TROMETHAMINE 30 MG/ML
30 INJECTION, SOLUTION INTRAMUSCULAR; INTRAVENOUS EVERY 6 HOURS
Status: DISPENSED | OUTPATIENT
Start: 2022-08-28 | End: 2022-08-28

## 2022-08-27 RX ORDER — LIDOCAINE HCL/EPINEPHRINE/PF 2%-1:200K
VIAL (ML) INJECTION PRN
Status: DISCONTINUED | OUTPATIENT
Start: 2022-08-27 | End: 2022-08-27 | Stop reason: SURG

## 2022-08-27 RX ORDER — ONDANSETRON 2 MG/ML
4 INJECTION INTRAMUSCULAR; INTRAVENOUS EVERY 6 HOURS PRN
Status: ACTIVE | OUTPATIENT
Start: 2022-08-27 | End: 2022-08-28

## 2022-08-27 RX ORDER — OXYCODONE HYDROCHLORIDE 5 MG/1
5 TABLET ORAL EVERY 4 HOURS PRN
Status: ACTIVE | OUTPATIENT
Start: 2022-08-27 | End: 2022-08-28

## 2022-08-27 RX ORDER — KETOROLAC TROMETHAMINE 30 MG/ML
INJECTION, SOLUTION INTRAMUSCULAR; INTRAVENOUS PRN
Status: DISCONTINUED | OUTPATIENT
Start: 2022-08-27 | End: 2022-08-27 | Stop reason: SURG

## 2022-08-27 RX ADMIN — SODIUM CITRATE AND CITRIC ACID MONOHYDRATE 30 ML: 500; 334 SOLUTION ORAL at 22:07

## 2022-08-27 RX ADMIN — ROPIVACAINE HYDROCHLORIDE 200 MG: 2 INJECTION, SOLUTION EPIDURAL; INFILTRATION at 05:55

## 2022-08-27 RX ADMIN — ROPIVACAINE HYDROCHLORIDE: 2 INJECTION, SOLUTION EPIDURAL; INFILTRATION at 14:13

## 2022-08-27 RX ADMIN — LIDOCAINE HYDROCHLORIDE,EPINEPHRINE BITARTRATE 3 ML: 15; .005 INJECTION, SOLUTION EPIDURAL; INFILTRATION; INTRACAUDAL; PERINEURAL at 05:50

## 2022-08-27 RX ADMIN — FAMOTIDINE 20 MG: 10 INJECTION, SOLUTION INTRAVENOUS at 22:07

## 2022-08-27 RX ADMIN — METOCLOPRAMIDE 10 MG: 5 INJECTION, SOLUTION INTRAMUSCULAR; INTRAVENOUS at 22:07

## 2022-08-27 RX ADMIN — ACETAMINOPHEN 1000 MG: 500 TABLET ORAL at 14:28

## 2022-08-27 RX ADMIN — SODIUM CHLORIDE, SODIUM GLUCONATE, SODIUM ACETATE, POTASSIUM CHLORIDE AND MAGNESIUM CHLORIDE: 526; 502; 368; 37; 30 INJECTION, SOLUTION INTRAVENOUS at 05:44

## 2022-08-27 RX ADMIN — SODIUM CHLORIDE, POTASSIUM CHLORIDE, SODIUM LACTATE AND CALCIUM CHLORIDE 1000 ML: 600; 310; 30; 20 INJECTION, SOLUTION INTRAVENOUS at 05:00

## 2022-08-27 RX ADMIN — AZITHROMYCIN MONOHYDRATE 500 MG: 500 INJECTION, POWDER, LYOPHILIZED, FOR SOLUTION INTRAVENOUS at 21:38

## 2022-08-27 RX ADMIN — OXYTOCIN 1 MILLI-UNITS/MIN: 10 INJECTION, SOLUTION INTRAMUSCULAR; INTRAVENOUS at 07:49

## 2022-08-27 RX ADMIN — MISOPROSTOL 25 MCG: 100 TABLET ORAL at 04:25

## 2022-08-27 RX ADMIN — LIDOCAINE HYDROCHLORIDE,EPINEPHRINE BITARTRATE 10 ML: 20; .005 INJECTION, SOLUTION EPIDURAL; INFILTRATION; INTRACAUDAL; PERINEURAL at 22:26

## 2022-08-27 RX ADMIN — DIPHENHYDRAMINE HYDROCHLORIDE 25 MG: 50 INJECTION INTRAMUSCULAR; INTRAVENOUS at 13:32

## 2022-08-27 RX ADMIN — SODIUM CHLORIDE, SODIUM GLUCONATE, SODIUM ACETATE, POTASSIUM CHLORIDE AND MAGNESIUM CHLORIDE 1000 ML: 526; 502; 368; 37; 30 INJECTION, SOLUTION INTRAVENOUS at 21:03

## 2022-08-27 RX ADMIN — MORPHINE SULFATE 1.5 MG: 0.5 INJECTION, SOLUTION EPIDURAL; INTRATHECAL; INTRAVENOUS at 22:50

## 2022-08-27 RX ADMIN — CLINDAMYCIN PHOSPHATE 900 MG: 900 INJECTION, SOLUTION INTRAVENOUS at 21:02

## 2022-08-27 RX ADMIN — BUPIVACAINE HYDROCHLORIDE 6 ML: 2.5 INJECTION, SOLUTION EPIDURAL; INFILTRATION; INTRACAUDAL; PERINEURAL at 05:50

## 2022-08-27 RX ADMIN — DIPHENHYDRAMINE HYDROCHLORIDE 25 MG: 50 INJECTION INTRAMUSCULAR; INTRAVENOUS at 17:50

## 2022-08-27 RX ADMIN — OXYTOCIN 20 UNITS: 10 INJECTION, SOLUTION INTRAMUSCULAR; INTRAVENOUS at 22:47

## 2022-08-27 RX ADMIN — KETOROLAC TROMETHAMINE 30 MG: 30 INJECTION, SOLUTION INTRAMUSCULAR at 23:06

## 2022-08-27 RX ADMIN — SODIUM CHLORIDE, SODIUM LACTATE, POTASSIUM CHLORIDE, CALCIUM CHLORIDE AND DEXTROSE MONOHYDRATE: 5; 600; 310; 30; 20 INJECTION, SOLUTION INTRAVENOUS at 14:35

## 2022-08-27 RX ADMIN — FENTANYL CITRATE 100 MCG: 50 INJECTION, SOLUTION INTRAMUSCULAR; INTRAVENOUS at 05:50

## 2022-08-27 RX ADMIN — PHENYLEPHRINE HYDROCHLORIDE 100 MCG: 10 INJECTION INTRAVENOUS at 22:28

## 2022-08-27 ASSESSMENT — PAIN DESCRIPTION - PAIN TYPE: TYPE: ACUTE PAIN

## 2022-08-27 ASSESSMENT — PAIN SCALES - GENERAL: PAIN_LEVEL: 0

## 2022-08-27 ASSESSMENT — PATIENT HEALTH QUESTIONNAIRE - PHQ9
1. LITTLE INTEREST OR PLEASURE IN DOING THINGS: NOT AT ALL
SUM OF ALL RESPONSES TO PHQ9 QUESTIONS 1 AND 2: 0
2. FEELING DOWN, DEPRESSED, IRRITABLE, OR HOPELESS: NOT AT ALL

## 2022-08-27 NOTE — ANESTHESIA PROCEDURE NOTES
Epidural Block    Date/Time: 8/27/2022 5:50 AM  Performed by: Anaid Gomez M.D.  Authorized by: Anaid Gomez M.D.     Patient Location:  OB  Start Time:  8/27/2022 5:50 AM  End Time:  8/27/2022 5:55 AM  Reason for Block: labor analgesia    patient identified, IV checked, site marked, risks and benefits discussed, surgical consent, monitors and equipment checked, pre-op evaluation and timeout performed    Patient Position:  Sitting  Prep: ChloraPrep, patient draped and sterile technique    Monitoring:  Blood pressure, continuous pulse oximetry and heart rate  Approach:  Midline  Location:  L3-L4  Injection Technique:  OLVIN saline  Skin infiltration:  Lidocaine  Strength:  1%  Dose:  3ml  Needle Type:  Tuohy  Needle Gauge:  17 G  Needle Length:  3.5 in  Loss of resistance::  6  Catheter Size:  19 G  Catheter at Skin Depth:  12  Test Dose Result:  Negative   Single pass, uneventful placement.

## 2022-08-27 NOTE — PROGRESS NOTES
"Labor and Delivery    S: Doing well. Comfortable with epidural    O: /79   Pulse 71   Temp 35.8 °C (96.5 °F) (Temporal)   Ht 1.575 m (5' 2\")   Wt 104 kg (230 lb)   SpO2 97%   Gen: NAD  SVE: 4/100/-2 AROM clear fluid    [place]: CTX q3-4  FHT: 145 with moderate LTV. +accels. Occ late. Occ variable    Labs: AB+, RI, GBS neg  Recent Labs     22  2319   WBC 7.8   RBC 3.82*   HEMOGLOBIN 10.5*   HEMATOCRIT 31.4*   MCV 82.2   MCH 27.5   RDW 40.1   PLATELETCT 341   MPV 12.5   NEUTSPOLYS 61.90   LYMPHOCYTES 31.10   MONOCYTES 6.00   EOSINOPHILS 0.10   BASOPHILS 0.30     A/P 21yo  @ 39w3d, elective IOL  Labor: remains latent. Consents to pitocin if indicated.   FWB: Cat II  Pain: adequately controlled.     Elsa Lane MD  "

## 2022-08-27 NOTE — PROGRESS NOTES
2100: pt  here at 39w2d for elective IOL. She reports chelle farrell and +FM. Denies LOF or bleeding. Admission orders initiated and plan discussed. ALISHAE /. Report given to DANITZA Fry

## 2022-08-27 NOTE — PROGRESS NOTES
"Labor and Delivery    S: Doing well. Comfortable with epidural. Feels cold and shaky.     O: /76   Pulse 71   Temp 36.6 °C (97.9 °F) (Temporal)   Ht 1.575 m (5' 2\")   Wt 104 kg (230 lb)   SpO2 97%   Gen: NAD  SVE: 5/100/-1 IUPC placed     [place]: CTX q2-4  FHT: 145 with moderate LTV. +accels. Occ lates    Labs: AB+, RI, GBS neg    A/P 23yo  @ 39w3d, elective IOL  Labor: Continue pitocin  FWB: Cat II  Pain: adequately controlled.     Elsa Lane MD  "

## 2022-08-27 NOTE — ANESTHESIA PREPROCEDURE EVALUATION
Date: 22  Procedure: Labor Epidural       21 yo  higuera IUP at 39-3 here for elective IOL requesting epidural for labor    plt 341  Relevant Problems   No relevant active problems       Physical Exam    Airway   Mallampati: II  TM distance: >3 FB  Neck ROM: full       Cardiovascular - normal exam  Rhythm: regular  Rate: normal  (-) murmur     Dental - normal exam           Pulmonary - normal exam  Breath sounds clear to auscultation     Abdominal    Neurological - normal exam                 Anesthesia Plan    ASA 3   ASA physical status 3 criteria: morbid obesity - BMI greater than or equal to 40    Plan - epidural   Neuraxial block will be labor analgesia                  Pertinent diagnostic labs and testing reviewed    Informed Consent:    Anesthetic plan and risks discussed with patient.

## 2022-08-27 NOTE — PROGRESS NOTES
0515- Assumed care of pt, POC discussed. Pt verbalized understanding. She is currently bolusing for an epidural.     0548- Dr. Gomez at bedside for epidural.     0554- Epidural catheter placed.     0555- Test dose.     1225- Elevated Bps reviewed with Dr. Lane. Orders received for PIH work up.     1430- Pt complains of HA, Tylenol given.     1610- SBAR given to DANITZA Worrell.

## 2022-08-27 NOTE — PROGRESS NOTES
"Labor and Delivery    S: Has a HA and pressure with CTX    O: BP (!) 114/90   Pulse (!) 105   Temp 36.6 °C (97.8 °F) (Temporal)   Ht 1.575 m (5' 2\")   Wt 104 kg (230 lb)   SpO2 97%   Gen: NAD  SVE: 9100/0    [place]: CTX q2-4  FHT: 150 with moderate LTV. +accels. Occ lates    Labs: AB+, RI, GBS neg    A/P 23yo  @ 39w3d, elective IOL, elevated BP   Labor: Continue pitocin  FWB: Cat II  Pain: adequately controlled.   Elevated BP: Likely c/w GHTN. R/o Nayan Lane MD  "

## 2022-08-28 LAB
ERYTHROCYTE [DISTWIDTH] IN BLOOD BY AUTOMATED COUNT: 41.2 FL (ref 35.9–50)
HCT VFR BLD AUTO: 31 % (ref 37–47)
HGB BLD-MCNC: 10.3 G/DL (ref 12–16)
MCH RBC QN AUTO: 27.4 PG (ref 27–33)
MCHC RBC AUTO-ENTMCNC: 33.2 G/DL (ref 33.6–35)
MCV RBC AUTO: 82.4 FL (ref 81.4–97.8)
PLATELET # BLD AUTO: 263 K/UL (ref 164–446)
PMV BLD AUTO: 12.5 FL (ref 9–12.9)
RBC # BLD AUTO: 3.76 M/UL (ref 4.2–5.4)
WBC # BLD AUTO: 14.7 K/UL (ref 4.8–10.8)

## 2022-08-28 PROCEDURE — A9270 NON-COVERED ITEM OR SERVICE: HCPCS | Performed by: ANESTHESIOLOGY

## 2022-08-28 PROCEDURE — 85027 COMPLETE CBC AUTOMATED: CPT

## 2022-08-28 PROCEDURE — 700102 HCHG RX REV CODE 250 W/ 637 OVERRIDE(OP): Performed by: ANESTHESIOLOGY

## 2022-08-28 PROCEDURE — A9270 NON-COVERED ITEM OR SERVICE: HCPCS | Performed by: OBSTETRICS & GYNECOLOGY

## 2022-08-28 PROCEDURE — 700111 HCHG RX REV CODE 636 W/ 250 OVERRIDE (IP)

## 2022-08-28 PROCEDURE — 36415 COLL VENOUS BLD VENIPUNCTURE: CPT

## 2022-08-28 PROCEDURE — 700102 HCHG RX REV CODE 250 W/ 637 OVERRIDE(OP): Performed by: OBSTETRICS & GYNECOLOGY

## 2022-08-28 PROCEDURE — 700105 HCHG RX REV CODE 258: Performed by: ANESTHESIOLOGY

## 2022-08-28 PROCEDURE — 700111 HCHG RX REV CODE 636 W/ 250 OVERRIDE (IP): Performed by: OBSTETRICS & GYNECOLOGY

## 2022-08-28 PROCEDURE — 700111 HCHG RX REV CODE 636 W/ 250 OVERRIDE (IP): Performed by: ANESTHESIOLOGY

## 2022-08-28 PROCEDURE — 770002 HCHG ROOM/CARE - OB PRIVATE (112)

## 2022-08-28 RX ORDER — SODIUM CHLORIDE, SODIUM LACTATE, POTASSIUM CHLORIDE, CALCIUM CHLORIDE 600; 310; 30; 20 MG/100ML; MG/100ML; MG/100ML; MG/100ML
INJECTION, SOLUTION INTRAVENOUS ONCE
Status: ACTIVE | OUTPATIENT
Start: 2022-08-28 | End: 2022-08-29

## 2022-08-28 RX ORDER — SODIUM CHLORIDE, SODIUM LACTATE, POTASSIUM CHLORIDE, CALCIUM CHLORIDE 600; 310; 30; 20 MG/100ML; MG/100ML; MG/100ML; MG/100ML
INJECTION, SOLUTION INTRAVENOUS PRN
Status: DISCONTINUED | OUTPATIENT
Start: 2022-08-28 | End: 2022-08-31 | Stop reason: HOSPADM

## 2022-08-28 RX ORDER — ONDANSETRON 4 MG/1
4 TABLET, ORALLY DISINTEGRATING ORAL EVERY 6 HOURS PRN
Status: DISCONTINUED | OUTPATIENT
Start: 2022-08-28 | End: 2022-08-31 | Stop reason: HOSPADM

## 2022-08-28 RX ORDER — DIPHENHYDRAMINE HCL 25 MG
25 TABLET ORAL EVERY 6 HOURS PRN
Status: DISCONTINUED | OUTPATIENT
Start: 2022-08-28 | End: 2022-08-31 | Stop reason: HOSPADM

## 2022-08-28 RX ORDER — MISOPROSTOL 200 UG/1
800 TABLET ORAL
Status: DISCONTINUED | OUTPATIENT
Start: 2022-08-28 | End: 2022-08-31 | Stop reason: HOSPADM

## 2022-08-28 RX ORDER — OXYCODONE HYDROCHLORIDE 5 MG/1
5 TABLET ORAL EVERY 4 HOURS PRN
Status: DISCONTINUED | OUTPATIENT
Start: 2022-08-28 | End: 2022-08-31 | Stop reason: HOSPADM

## 2022-08-28 RX ORDER — IBUPROFEN 800 MG/1
800 TABLET ORAL EVERY 8 HOURS
Status: DISCONTINUED | OUTPATIENT
Start: 2022-08-29 | End: 2022-08-31 | Stop reason: HOSPADM

## 2022-08-28 RX ORDER — VITAMIN A ACETATE, BETA CAROTENE, ASCORBIC ACID, CHOLECALCIFEROL, .ALPHA.-TOCOPHEROL ACETATE, DL-, THIAMINE MONONITRATE, RIBOFLAVIN, NIACINAMIDE, PYRIDOXINE HYDROCHLORIDE, FOLIC ACID, CYANOCOBALAMIN, CALCIUM CARBONATE, FERROUS FUMARATE, ZINC OXIDE, CUPRIC OXIDE 3080; 12; 120; 400; 1; 1.84; 3; 20; 22; 920; 25; 200; 27; 10; 2 [IU]/1; UG/1; MG/1; [IU]/1; MG/1; MG/1; MG/1; MG/1; MG/1; [IU]/1; MG/1; MG/1; MG/1; MG/1; MG/1
1 TABLET, FILM COATED ORAL
Status: DISCONTINUED | OUTPATIENT
Start: 2022-08-28 | End: 2022-08-31 | Stop reason: HOSPADM

## 2022-08-28 RX ORDER — ACETAMINOPHEN 500 MG
1000 TABLET ORAL EVERY 6 HOURS PRN
Status: DISCONTINUED | OUTPATIENT
Start: 2022-09-01 | End: 2022-08-31 | Stop reason: HOSPADM

## 2022-08-28 RX ORDER — DOCUSATE SODIUM 100 MG/1
100 CAPSULE, LIQUID FILLED ORAL 2 TIMES DAILY PRN
Status: DISCONTINUED | OUTPATIENT
Start: 2022-08-28 | End: 2022-08-31 | Stop reason: HOSPADM

## 2022-08-28 RX ORDER — ONDANSETRON 2 MG/ML
4 INJECTION INTRAMUSCULAR; INTRAVENOUS EVERY 6 HOURS PRN
Status: DISCONTINUED | OUTPATIENT
Start: 2022-08-28 | End: 2022-08-31 | Stop reason: HOSPADM

## 2022-08-28 RX ORDER — ACETAMINOPHEN 500 MG
1000 TABLET ORAL EVERY 6 HOURS
Status: DISCONTINUED | OUTPATIENT
Start: 2022-08-29 | End: 2022-08-31 | Stop reason: HOSPADM

## 2022-08-28 RX ORDER — CARBOPROST TROMETHAMINE 250 UG/ML
250 INJECTION, SOLUTION INTRAMUSCULAR
Status: DISCONTINUED | OUTPATIENT
Start: 2022-08-28 | End: 2022-08-31 | Stop reason: HOSPADM

## 2022-08-28 RX ORDER — OXYCODONE HYDROCHLORIDE 10 MG/1
10 TABLET ORAL EVERY 4 HOURS PRN
Status: DISCONTINUED | OUTPATIENT
Start: 2022-08-28 | End: 2022-08-31 | Stop reason: HOSPADM

## 2022-08-28 RX ORDER — IBUPROFEN 800 MG/1
800 TABLET ORAL EVERY 8 HOURS PRN
Status: DISCONTINUED | OUTPATIENT
Start: 2022-09-01 | End: 2022-08-31 | Stop reason: HOSPADM

## 2022-08-28 RX ORDER — DIPHENHYDRAMINE HYDROCHLORIDE 50 MG/ML
25 INJECTION INTRAMUSCULAR; INTRAVENOUS EVERY 6 HOURS PRN
Status: DISCONTINUED | OUTPATIENT
Start: 2022-08-28 | End: 2022-08-31 | Stop reason: HOSPADM

## 2022-08-28 RX ORDER — OXYTOCIN 10 [USP'U]/ML
10 INJECTION, SOLUTION INTRAMUSCULAR; INTRAVENOUS
Status: DISCONTINUED | OUTPATIENT
Start: 2022-08-28 | End: 2022-08-31 | Stop reason: HOSPADM

## 2022-08-28 RX ADMIN — ACETAMINOPHEN 1000 MG: 500 TABLET ORAL at 01:46

## 2022-08-28 RX ADMIN — SODIUM CHLORIDE, POTASSIUM CHLORIDE, SODIUM LACTATE AND CALCIUM CHLORIDE: 600; 310; 30; 20 INJECTION, SOLUTION INTRAVENOUS at 00:32

## 2022-08-28 RX ADMIN — ACETAMINOPHEN 1000 MG: 500 TABLET ORAL at 06:51

## 2022-08-28 RX ADMIN — DOCUSATE SODIUM 100 MG: 100 CAPSULE, LIQUID FILLED ORAL at 09:45

## 2022-08-28 RX ADMIN — DIPHENHYDRAMINE HYDROCHLORIDE 12.5 MG: 50 INJECTION INTRAMUSCULAR; INTRAVENOUS at 15:07

## 2022-08-28 RX ADMIN — DOCUSATE SODIUM 100 MG: 100 CAPSULE, LIQUID FILLED ORAL at 21:54

## 2022-08-28 RX ADMIN — KETOROLAC TROMETHAMINE 30 MG: 30 INJECTION, SOLUTION INTRAMUSCULAR; INTRAVENOUS at 21:41

## 2022-08-28 RX ADMIN — Medication 125 ML/HR: at 00:15

## 2022-08-28 RX ADMIN — KETOROLAC TROMETHAMINE 30 MG: 30 INJECTION, SOLUTION INTRAMUSCULAR; INTRAVENOUS at 14:55

## 2022-08-28 RX ADMIN — ACETAMINOPHEN 1000 MG: 500 TABLET ORAL at 15:10

## 2022-08-28 RX ADMIN — PRENATAL WITH FERROUS FUM AND FOLIC ACID 1 TABLET: 3080; 920; 120; 400; 22; 1.84; 3; 20; 10; 1; 12; 200; 27; 25; 2 TABLET ORAL at 09:45

## 2022-08-28 RX ADMIN — KETOROLAC TROMETHAMINE 30 MG: 30 INJECTION, SOLUTION INTRAMUSCULAR; INTRAVENOUS at 06:52

## 2022-08-28 RX ADMIN — DIPHENHYDRAMINE HYDROCHLORIDE 12.5 MG: 50 INJECTION, SOLUTION INTRAMUSCULAR; INTRAVENOUS at 15:07

## 2022-08-28 RX ADMIN — DIPHENHYDRAMINE HYDROCHLORIDE 12.5 MG: 50 INJECTION INTRAMUSCULAR; INTRAVENOUS at 21:42

## 2022-08-28 RX ADMIN — DIPHENHYDRAMINE HYDROCHLORIDE 12.5 MG: 50 INJECTION INTRAMUSCULAR; INTRAVENOUS at 06:52

## 2022-08-28 ASSESSMENT — PAIN DESCRIPTION - PAIN TYPE
TYPE: ACUTE PAIN
TYPE: ACUTE PAIN
TYPE: SURGICAL PAIN;ACUTE PAIN
TYPE: ACUTE PAIN
TYPE: SURGICAL PAIN;ACUTE PAIN
TYPE: SURGICAL PAIN;ACUTE PAIN

## 2022-08-28 ASSESSMENT — EDINBURGH POSTNATAL DEPRESSION SCALE (EPDS)
I HAVE BLAMED MYSELF UNNECESSARILY WHEN THINGS WENT WRONG: NOT VERY OFTEN
I HAVE BEEN ABLE TO LAUGH AND SEE THE FUNNY SIDE OF THINGS: AS MUCH AS I ALWAYS COULD
I HAVE FELT SCARED OR PANICKY FOR NO GOOD REASON: NO, NOT AT ALL
I HAVE BEEN SO UNHAPPY THAT I HAVE HAD DIFFICULTY SLEEPING: NOT AT ALL
I HAVE FELT SAD OR MISERABLE: NO, NOT AT ALL
I HAVE BEEN SO UNHAPPY THAT I HAVE BEEN CRYING: NO, NEVER
I HAVE BEEN ANXIOUS OR WORRIED FOR NO GOOD REASON: HARDLY EVER
THE THOUGHT OF HARMING MYSELF HAS OCCURRED TO ME: NEVER
THINGS HAVE BEEN GETTING ON TOP OF ME: NO, MOST OF THE TIME I HAVE COPED QUITE WELL
I HAVE LOOKED FORWARD WITH ENJOYMENT TO THINGS: AS MUCH AS I EVER DID

## 2022-08-28 NOTE — PROGRESS NOTES
1610-Pt care assumed.  1640-Dr. Lane contacted about POC-orders to recheck cervix.  1700-Pt requests that she be able to sleep for an additional 30 minutes before exam.  Dr. Lane updated.  1735-SVE=cervix is swollen and not reducible, Dr. Lane updated and requested her to examine patient.  Orders for benadryl IV.  1750-Benadryl given and oxytocin increased to 3 milliunits /min.  1845-Pt called out stating she was feeling like pushing.  SVE=cervix is still swollen and not reducible.  During the assessment the epidural catheter was found detached at the port.  Dr. Mcmillan called made aware of the epidural catheter .  Dr. Lane called and updated on most recent events.  Dr. Lane at BS to assess [id number] epidural and continue to increase oxytocin.  1900-BS report given to Lisandra RN-pt care assumed.  Dr. Mcmillan at BS to replace epidural .

## 2022-08-28 NOTE — LACTATION NOTE
This note was copied from a baby's chart.  22yr, , 39wk3d    Mom has questions about breastfeeding.  Not sure she wants to continue breastfeeding and may switch to pumping and providing.  Offered to assist but denies wanting assistance at this time and says that baby just finished breastfeeding and is now asleep. Shown how to properly perform hand expression with colostrum easily expressed.    Basic breastfeeding information provided including feeding baby with feeding cues and at least a minimum of 8x/24 hours.  Expect cluster feeding as this is normal during early days of life and growth spurts.  It is not recommended to let baby sleep longer than 4 hours between feedings and if sleepy, place skin to skin to promote feeding interest and milk production.  Baby's usually feed more frequently and longer when skin to skin with mother.     Reminded mom of importance of a good and deep latch when baby is breastfeeding to avoid nipple pain and breakdown.  Instructed to call RN or LC with breastfeeding for latch assessment.    [facility] Breastfeeding Resource sheet provided  Milk Storage guidelines provided  Breastfeeding support [place] information provided

## 2022-08-28 NOTE — CARE PLAN
The patient is Stable - Low risk of patient condition declining or worsening    Shift Goals  Clinical Goals: adequate pain control, lochia remains WNL    Progress made toward(s) clinical / shift goals:  Patient VS stable and WDL. Ambulated up to restroom for lorna care, tolerated ambulating well. Fundus firm, lochia scant to light rubra.    Patient is not progressing towards the following goals:

## 2022-08-28 NOTE — PROGRESS NOTES
"Labor and Delivery    S: Got some rest but having a little pain     O: /65   Pulse 71   Temp 36.6 °C (97.8 °F) (Temporal)   Ht 1.575 m (5' 2\")   Wt 104 kg (230 lb)   SpO2 97%   Gen: NAD  SVE: 8-9/100/0 slight swelling present     [place]: CTX q2-5  FHT: 145 with moderate LTV. +accels. Occ lates    Labs: AB+, RI, GBS neg    A/P 21yo  @ 39w3d, elective IOL, elevated BP   Labor: Continue pitocin. Benadryl given for swollen cervix   FWB: Cat II  Pain: adequately controlled.   Elevated BP: Likely c/w GHTN. R/o Naayn Lane MD  "

## 2022-08-28 NOTE — PROGRESS NOTES
"POD#1    S: Doing well. Pain controlled but itchy! Lochia normal. Duncan in place and not yet out of bed. Denies nausea. Working on latch.     O: /71   Pulse 65   Temp 36.4 °C (97.6 °F) (Temporal)   Resp 16   Ht 1.575 m (5' 2\")   Wt 104 kg (230 lb)   SpO2 94%   Gen: NAD  Fundus firm below umbilicus  Incision: Mepilex in place. C/d/I  Ext: +1 LE bilaterally    Labs: AB+, RI, GBS neg  Recent Labs     22  2319   WBC 7.8   RBC 3.82*   HEMOGLOBIN 10.5*   HEMATOCRIT 31.4*   MCV 82.2   MCH 27.5   RDW 40.1   PLATELETCT 341   MPV 12.5   NEUTSPOLYS 61.90   LYMPHOCYTES 31.10   MONOCYTES 6.00   EOSINOPHILS 0.10   BASOPHILS 0.30     A/P 23yo  s/p LTCS arrest stage I labor following elective IOL, Gestational HTN  Post op: routine care  GHTN: no s/sx of PreE  Anemia: present on admission. Asymptomatic. AM labs pending  Dispo: home in 1-2 days   "

## 2022-08-28 NOTE — PROGRESS NOTES
"Labor and Delivery    S: Feeling more pain since epidural came out.     O: /65   Pulse 71   Temp 36.6 °C (97.8 °F) (Temporal)   Ht 1.575 m (5' 2\")   Wt 104 kg (230 lb)   SpO2 97%   Gen: NAD  SVE: 8-9/100/0 unchanged    [place]: CTX q2-5  FHT: 150 with moderate LTV. +accels. Occ lates    Labs: AB+, RI, GBS neg    A/P 21yo  @ 39w3d, elective IOL, elevated BP   Labor: Minimal change in cervix. MVU almost adequate. Discussed concerns with patient. Will replace epidural and recheck. If unchanged at next SVE, discussed potential need for LTCS for arrest  FWB: Cat II  Pain: Anesthesia present to replace.   Elevated BP: Likely c/w GHTN. R/o Nayan Lane MD  "

## 2022-08-28 NOTE — OP REPORT
OP Note    PreOp Diagnosis:   Term IUP @ 39w  Arrest of Stage I labor  Gestational HTN  Class III obesity      PostOp Diagnosis:   S/p LTCS  Gesational HTN  Class III obesity      Procedure(s):   SECTION, PRIMARY    Surgeon(s):  MARKUS Wallis D.O.    Anesthesiologist/Type of Anesthesia:  Anesthesiologist: Matheus Mcmillan M.D.; Anaid Gomez M.D./Spinal    Surgical Staff:  Circulator: Rylee Chadwick R.N.  Scrub Person: Rylee Joya  L&D Baby  Nurse: Catina Miller R.N.    Procedure:   The patient was taken back to the operating room.  She already had an epidural in place.  This was rebolused.  SCDs were in place.  She received both an abdominal and vaginal prep.  She was sterilely draped in the usual fashion.  After assuring adequate anesthesia Pfannenstiel skin incision was made sharply and carried down to the level fascia using electrocautery.  The fascial incision was extended bilaterally with Petersen scissors.  Kocher's were placed on the inferior aspect of the fascia which was dissected off both inferiorly and superiorly.  Midline of the rectus was identified and the peritoneum was entered bluntly.  The Bubba O retractor was placed.  Bladder flap was created using Metzenbaum scissors.  Hysterotomy site was made sharply and clear amniotic fluid was noted.  The fetal head was elevated out of the pelvis.  It was noted to be in OA position.  Anterior and posterior shoulders delivered without difficulty.  Baby girl was immediately vigorous.  Cortisol to pulsate for 30 seconds at which point it was clamped and cut.  The placenta delivered easily and intact with removal of trailing membranes.  The uterus was cleaned internally at to assure no remaining products of conception.  Hysterotomy site was closed with running suture of 0 Vicryl.  Tubes and ovaries were inspected and were otherwise unremarkable.  Peritoneal incision was reapproximated 3-0 Vicryl.  Fascia was  "closed with a running suture of 0 Vicryl.  Subcutaneous tissue was irrigated and reapproximated 3-0 Vicryl.  Skin was closed with 4-0 Vicryl.  Mepilex dressing was placed    Specimens removed if any:  Cord gases     Estimated Blood Loss: 500cc  Fluids: 800cc  UOP: 300cc    Medications:   Clindamycin 900mg  Azithromycin 500mg  Pitocin    Findings:   Baby Girl \"Pat Guillaume\" @ 2246, Apgars 8/9, Weight 8eg63ac,  Normal uterus, ovaries, and tubes  Normal placenta with 3v cord  Clear amniotic fluid    Complications: none apparent         8/27/2022 11:27 PM Elsa Lane M.D.  "

## 2022-08-28 NOTE — ANESTHESIA TIME REPORT
Anesthesia Start and Stop Event Times     Date Time Event    8/27/2022 0517 Ready for Procedure     0544 Anesthesia Start     2316 Anesthesia Stop        Responsible Staff  08/27/22    Name Role Begin End    Anaid Gomez M.D. Anesth 0544 0708    Matheus Mcmillan M.D. Anesth 0708 2316        Overtime Reason:  no overtime (within assigned shift)    Comments:

## 2022-08-28 NOTE — PROGRESS NOTES
0655- Received report from DANITZA Martini. Assumed care of pt. Patient currently asleep. 12 hour chart check, MAR, and orders reviewed at this time.     0855- Assessment complete. Fundus firm and palpable, lochia scant to light rubra. Patient with amado catheter in place at this time, adequate output. Incision with mepilex dressing, no drainage present. POC discussed with parents at bedside. Second bag of pitocin complete. MC rounding on patient at bedside. All questions answered at this time.

## 2022-08-28 NOTE — PROGRESS NOTES
1900- Bedside report received from Anaid BOSCH., POC discussed, care assumed. Dr. Mcmillan at bedside for full replacement of epidural, pt tolerated well.     2045- DrCassandra Working at bedside, SVE unchanged. MD discussed with pt about moving forward with an operative delivery. Pt and FOB agreed. All questions answered, orders placed. MD would like 900mg clindamycin and 500mg azithromycin given prior to procedure.     2315- Pt tolerated procedure well. Resting comfortably in PACU with infant in arms.     0015- Normal sinus rhythm noted, strip printed.   0030- Pt in stable condition, transported to PP unit via gurney with infant in arms, personal belongings and FOB at side. Bedside report given to Lianet BOSCH., POC discussed, care relinquished. Epidural removed, tip intact, pt tolerated well. ID bands and fundal rub verified by 2 RN. Pt and FOB deny any questions for this RN.

## 2022-08-28 NOTE — PROGRESS NOTES
Labor and Delivery    Velvet is a 22-year-old  1 para 0 who is undergoing elective induction of labor at 39 weeks and 3 days.  She received 2 doses of Cytotec and then was augmented with Pitocin.  She underwent amniotomy to reveal clear fluid.  She progressed quickly from 5 cm to 9 cm but then her cervical change has significantly slowed down.  She has had replacement of an epidural for pain management, augmentation with Pitocin, and treatment with Benadryl to assist in the cervical swelling.  Unfortunately these interventions have not resulted in her progressing to complete.  Based on this I would recommend that we proceed with a primary  section for arrest of stage I of labor.  Fetal surveillance is consistent with category 1 tracing we plan to proceed the operating room shortly    Elsa Lane MD

## 2022-08-28 NOTE — ANESTHESIA POSTPROCEDURE EVALUATION
Patient: Velvet Mota    Procedure Summary     Date: 22 Room / Location: [facility] OR 02 / SURGERY LABOR AND DELIVERY    Anesthesia Start: 544 Anesthesia Stop:     Procedure:  SECTION, PRIMARY (Abdomen) Diagnosis: (39.3 weeks gestation, ARREST OF DILATATION)    Surgeons: Elsa Lane M.D. Responsible Provider: Matheus Mcmillan M.D.    Anesthesia Type: epidural ASA Status: 3          Final Anesthesia Type: epidural  Last vitals  BP   Blood Pressure: 126/59    Temp   36.6 °C (97.9 °F)    Pulse   68   Resp        SpO2   96 %      Anesthesia Post Evaluation    Patient location during evaluation: floor  Patient participation: complete - patient participated  Level of consciousness: awake and alert  Pain score: 0    Airway patency: patent  Anesthetic complications: no  Cardiovascular status: hemodynamically stable  Respiratory status: acceptable  Hydration status: euvolemic    PONV: none          No notable events documented.

## 2022-08-28 NOTE — DISCHARGE PLANNING
"Discharge Planning Assessment Post Partum    Reason for Referral: Anxiety and depression  Address: [address] [place] [address]  Type of Living Situation:Lives with uncle and FOB  Mom Diagnosis: S/p LTCS  Baby Diagnosis: Birth, Apgars 8/9.  Primary Language: English    Name of Baby: Pat Ann  Father of the Baby: Jayme Ann  Involved in baby’s care? Yes, at bedside.   Contact Information: [phone]    Prenatal Care: Yes, per MOB with [facility].   Mom's PCP: Harika Cruz  PCP for new baby:MOB reports she has a list and will start calling to find a provider    Support System: MOB reports FOB and her uncle  Coping/Bonding between mother & baby: Yes, per MOB  Source of Feeding: Breastfeeding, but reports latching issues and would like so assistance.   Supplies for Infant: Car seat, bassinet for safe sleeping     Mom's Insurance: United health care and Medicaid  Baby Covered on Insurance:will add baby  Mother Employed/School: MOB and FOB are no working  Other children in the home/names & ages: No, per MOB    Financial Hardship/Income: No, per MOB   Mom's Mental status: Alert and oriented . Mood: \"okay.\" MOB: Denied SI/HI Affect: engaged and pleasant.  Services [facility] prior to admit: [facility]    CPS History: No, per MOB  Psychiatric History: MOB reports she had depression and anxiety after her father passed away at age 16 years old. MOB reports she was on medication before she was pregnant then her medication was changed when she was pregnant and the medication made symptoms worst. MOB reports she does not have a mental health provider currently. MOB accept a list of post partum support and mental; health provider list. This writer referred with MOB, FOB, and MOB's sister on post partum signs and symptoms and were to seek help if needed.  Domestic Violence History: None, per MOB  Drug/ETOH History: None, per MOB    Resources Provided: Post partum list of supports and resources. Safe sleep " educations.   Referrals Made: None.     Clearance for Discharge: MOB and baby are cleared by .

## 2022-08-28 NOTE — PROGRESS NOTES
Patient transferred from labor and delivery in wheelchair with infant in arms and FOB accompanying with belongings. Two RN verification of infant and parent armbands. Report received from Lisandra RN. Patient oriented to unit, call light, and POC. Assessment completed, fundus firm and palpable, incision cdi, lochia light/moderate rubra.  Second bag of pitocin infusing at 125 ml/hr. IV patent, no s/s of infiltration at insertion site. Patient encouraged to call with needs

## 2022-08-29 PROCEDURE — 770002 HCHG ROOM/CARE - OB PRIVATE (112)

## 2022-08-29 PROCEDURE — A9270 NON-COVERED ITEM OR SERVICE: HCPCS | Performed by: OBSTETRICS & GYNECOLOGY

## 2022-08-29 PROCEDURE — 700102 HCHG RX REV CODE 250 W/ 637 OVERRIDE(OP): Performed by: OBSTETRICS & GYNECOLOGY

## 2022-08-29 RX ADMIN — IBUPROFEN 800 MG: 800 TABLET, FILM COATED ORAL at 16:58

## 2022-08-29 RX ADMIN — ACETAMINOPHEN 1000 MG: 500 TABLET ORAL at 18:44

## 2022-08-29 RX ADMIN — OXYCODONE HYDROCHLORIDE 10 MG: 10 TABLET ORAL at 16:58

## 2022-08-29 RX ADMIN — OXYCODONE HYDROCHLORIDE 10 MG: 10 TABLET ORAL at 21:20

## 2022-08-29 RX ADMIN — OXYCODONE HYDROCHLORIDE 10 MG: 10 TABLET ORAL at 05:31

## 2022-08-29 RX ADMIN — DOCUSATE SODIUM 100 MG: 100 CAPSULE, LIQUID FILLED ORAL at 13:53

## 2022-08-29 RX ADMIN — DOCUSATE SODIUM 100 MG: 100 CAPSULE, LIQUID FILLED ORAL at 21:20

## 2022-08-29 RX ADMIN — ACETAMINOPHEN 1000 MG: 500 TABLET ORAL at 12:52

## 2022-08-29 RX ADMIN — ACETAMINOPHEN 1000 MG: 500 TABLET ORAL at 05:30

## 2022-08-29 RX ADMIN — ACETAMINOPHEN 1000 MG: 500 TABLET ORAL at 00:22

## 2022-08-29 RX ADMIN — PRENATAL WITH FERROUS FUM AND FOLIC ACID 1 TABLET: 3080; 920; 120; 400; 22; 1.84; 3; 20; 10; 1; 12; 200; 27; 25; 2 TABLET ORAL at 08:24

## 2022-08-29 RX ADMIN — IBUPROFEN 800 MG: 800 TABLET, FILM COATED ORAL at 00:21

## 2022-08-29 RX ADMIN — IBUPROFEN 800 MG: 800 TABLET, FILM COATED ORAL at 08:23

## 2022-08-29 RX ADMIN — OXYCODONE 5 MG: 5 TABLET ORAL at 10:43

## 2022-08-29 RX ADMIN — OXYCODONE HYDROCHLORIDE 10 MG: 10 TABLET ORAL at 00:22

## 2022-08-29 ASSESSMENT — PAIN DESCRIPTION - PAIN TYPE
TYPE: SURGICAL PAIN
TYPE: ACUTE PAIN;SURGICAL PAIN
TYPE: SURGICAL PAIN
TYPE: SURGICAL PAIN
TYPE: ACUTE PAIN;SURGICAL PAIN
TYPE: SURGICAL PAIN
TYPE: ACUTE PAIN;SURGICAL PAIN

## 2022-08-29 NOTE — PROGRESS NOTES
0656- Bedside report received from DANITZA Howard.  Patient denied needs.  1043- Patient assessment done.  Patient reported she is voiding without difficulty and passing flatus.  Patient denied dizziness and reported that she is walking without difficulty.  Patient instructed to ambulate in hallways four times a day.  Patient instructed to use IS ten times every hour.  Discussed pain management plan, to include MD orders for scheduled tylenol and ibuprofen.  Reviewed plan of care.  Patient verbalized understanding.  FOB at bedside.

## 2022-08-29 NOTE — PROGRESS NOTES
"POD#2    S: \"I think I over did it.\" Pain currently 5/10, 6 with ambulation. Lochia normal. +Flatus and BM but constipated. BF latch not there yet and lots of cluster feeding last night. She's exhausted.     O: /87   Pulse 78   Temp 36.3 °C (97.4 °F) (Temporal)   Resp 17   Ht 1.575 m (5' 2\")   Wt 104 kg (230 lb)   SpO2 95%   Gen: NAD  Fundus firm below umbilicus  Incision: Mepilex in place. C/d/I  Ext: +1 LE bilaterally    Labs: AB+, RI, GBS neg  No new    A/P 21yo  s/p LTCS arrest stage I labor following elective IOL, Gestational HTN  Post op: routine care  GHTN: no s/sx of PreE  Anemia: present on admission. Asymptomatic.  Dispo: home in 1-2 days   "

## 2022-08-29 NOTE — CARE PLAN
The patient is Stable - Low risk of patient condition declining or worsening    Shift Goals  Clinical Goals: breastfeeding, pain control, lochia wnl  Patient Goals: rest, bond with baby  Family Goals: support    Progress made toward(s) clinical / shift goals: Discussed about breastfeeding and educate pt on sleepy baby, latching, baby amount to eat, hand expression and holding baby. Pt demonstrated understanding. POC and medications discussed with pt. Pt reported 6/10 of surgical pain. Medicated per MAR for pain. Pt able to ambulate out of bed. Incision site has no concerns.      Problem: Knowledge Deficit - Postpartum  Goal: Patient will verbalize and demonstrate understanding of self and infant care  Outcome: Progressing     Problem: Pain - Standard  Goal: Alleviation of pain or a reduction in pain to the patient’s comfort goal  Outcome: Progressing     Problem: Psychosocial - Postpartum  Goal: Patient will verbalize and demonstrate effective bonding and parenting behavior  Outcome: Progressing     Problem: Infection - Postpartum  Goal: Postpartum patient will be free of signs and symptoms of infection  Outcome: Progressing     Problem: Bowel Elimination - Post Surgical  Goal: Patient will resume regular bowel sounds and function with no discomfort or distention  Outcome: Progressing     Problem: Early Mobilization - Post Surgery  Goal: Early mobilization post surgery  Outcome: Progressing

## 2022-08-29 NOTE — LACTATION NOTE
"This note was copied from a baby's chart.  Lactation follow up:    Mom says that baby was cluster feeding all night long and that she had \"a horrible night\".    Explained to parents that cluster feeding is normal  breastfeeding behavior in the early days and weeks of life as milk production is becoming established and also as babies go through growing spurts.  Recommended skin to skin as much as possible while mom is awake to allow baby to cluster feed.  Also recommended to dad to allow baby to be asleep and skin to skin with dad to give mom a break.      Reassured mom that baby is showing normal behavior and encouraged to keep allowing her to breastfeed whenever she is showing interest or cues and to follow up with lactation support circles and pediatrician appointments for continued assessment of breastfeeding improvement.  "

## 2022-08-30 ENCOUNTER — PHARMACY VISIT (OUTPATIENT)
Dept: PHARMACY | Facility: MEDICAL CENTER | Age: 22
End: 2022-08-30
Payer: COMMERCIAL

## 2022-08-30 PROCEDURE — 700102 HCHG RX REV CODE 250 W/ 637 OVERRIDE(OP): Performed by: OBSTETRICS & GYNECOLOGY

## 2022-08-30 PROCEDURE — 770002 HCHG ROOM/CARE - OB PRIVATE (112)

## 2022-08-30 PROCEDURE — A9270 NON-COVERED ITEM OR SERVICE: HCPCS | Performed by: OBSTETRICS & GYNECOLOGY

## 2022-08-30 PROCEDURE — RXMED WILLOW AMBULATORY MEDICATION CHARGE: Performed by: OBSTETRICS & GYNECOLOGY

## 2022-08-30 RX ORDER — IBUPROFEN 800 MG/1
800 TABLET ORAL EVERY 8 HOURS
Qty: 30 TABLET | Refills: 0 | Status: SHIPPED | OUTPATIENT
Start: 2022-08-30 | End: 2024-02-05

## 2022-08-30 RX ORDER — OXYCODONE HYDROCHLORIDE 5 MG/1
5 TABLET ORAL EVERY 4 HOURS PRN
Qty: 20 TABLET | Refills: 0 | Status: SHIPPED | OUTPATIENT
Start: 2022-08-30 | End: 2022-09-04

## 2022-08-30 RX ADMIN — ACETAMINOPHEN 1000 MG: 500 TABLET ORAL at 19:23

## 2022-08-30 RX ADMIN — IBUPROFEN 800 MG: 800 TABLET, FILM COATED ORAL at 17:38

## 2022-08-30 RX ADMIN — OXYCODONE HYDROCHLORIDE 10 MG: 10 TABLET ORAL at 13:22

## 2022-08-30 RX ADMIN — IBUPROFEN 800 MG: 800 TABLET, FILM COATED ORAL at 08:59

## 2022-08-30 RX ADMIN — OXYCODONE HYDROCHLORIDE 10 MG: 10 TABLET ORAL at 00:58

## 2022-08-30 RX ADMIN — OXYCODONE 5 MG: 5 TABLET ORAL at 22:25

## 2022-08-30 RX ADMIN — ACETAMINOPHEN 1000 MG: 500 TABLET ORAL at 05:16

## 2022-08-30 RX ADMIN — OXYCODONE HYDROCHLORIDE 10 MG: 10 TABLET ORAL at 05:17

## 2022-08-30 RX ADMIN — IBUPROFEN 800 MG: 800 TABLET, FILM COATED ORAL at 00:10

## 2022-08-30 RX ADMIN — DOCUSATE SODIUM 100 MG: 100 CAPSULE, LIQUID FILLED ORAL at 05:17

## 2022-08-30 RX ADMIN — ACETAMINOPHEN 1000 MG: 500 TABLET ORAL at 13:22

## 2022-08-30 RX ADMIN — PRENATAL WITH FERROUS FUM AND FOLIC ACID 1 TABLET: 3080; 920; 120; 400; 22; 1.84; 3; 20; 10; 1; 12; 200; 27; 25; 2 TABLET ORAL at 08:59

## 2022-08-30 RX ADMIN — ACETAMINOPHEN 1000 MG: 500 TABLET ORAL at 00:10

## 2022-08-30 ASSESSMENT — PAIN DESCRIPTION - PAIN TYPE: TYPE: ACUTE PAIN;SURGICAL PAIN

## 2022-08-30 NOTE — DISCHARGE SUMMARY
Discharge Summary:     Date of Admission: 2022  Date of Discharge: 22      Admitting diagnosis:    1. Pregnancy @ 39w3d  2. Gestational hypertension      Discharge Diagnosis:   1. Status post  for failure to progress.  2. Gestational hypertension, resolved    Past Medical History:   Diagnosis Date    Gynecological disorder      OB History    Para Term  AB Living   1 1 1     1   SAB IAB Ectopic Molar Multiple Live Births           0 1      # Outcome Date GA Lbr Austyn/2nd Weight Sex Delivery Anes PTL Lv   1 Term 22 39w3d  3.11 kg (6 lb 13.7 oz) F CS-LTranv EPI, Spinal N NAVARRO      Complications: Failure to Progress in Second Stage     Past Surgical History:   Procedure Laterality Date    PRIMARY C SECTION Bilateral 2022    Procedure:  SECTION, PRIMARY;  Surgeon: Elsa Lane M.D.;  Location: SURGERY LABOR AND DELIVERY;  Service: Labor and Delivery     Rocephin [ceftriaxone]    Hospital Course:   Pt is a 22 y.o. now  who presented for induction for above. She was ultimately diagnosed with arrest stage 1 labor and taken for c/section. Please see op note for details. She was transitioned to postpartum.     On day of discharge pt was ambulating, voiding spontaneously, tolerating PO, with adequate pain control, and desiring to go home.    Physical Exam:  Temp:  [36.2 °C (97.1 °F)-36.4 °C (97.6 °F)] 36.4 °C (97.6 °F)  Pulse:  [80-86] 86  Resp:  [18] 18  BP: (132-139)/(80-89) 132/80  SpO2:  [97 %] 97 %  Gen: AAO, NAD  Resp: breathing unlabored  Abd: soft, NT, ND, fundus firm below umiblicus   Incision C/D/I  Ext: NT, 1+edema    Current Facility-Administered Medications   Medication Dose    lactated ringers infusion      ibuprofen (MOTRIN) tablet 800 mg  800 mg    Followed by    [START ON 2022] ibuprofen (MOTRIN) tablet 800 mg  800 mg    acetaminophen (TYLENOL) tablet 1,000 mg  1,000 mg    Followed by    [START ON 2022] acetaminophen (TYLENOL) tablet 1,000  mg  1,000 mg    oxyCODONE immediate-release (ROXICODONE) tablet 5 mg  5 mg    oxyCODONE immediate release (ROXICODONE) tablet 10 mg  10 mg    ondansetron (ZOFRAN) syringe/vial injection 4 mg  4 mg    Or    ondansetron (ZOFRAN ODT) dispertab 4 mg  4 mg    diphenhydrAMINE (BENADRYL) tablet/capsule 25 mg  25 mg    Or    diphenhydrAMINE (BENADRYL) injection 25 mg  25 mg    docusate sodium (COLACE) capsule 100 mg  100 mg    measles, mumps and rubella vaccine (MMR) injection 0.5 mL  0.5 mL    oxytocin (PITOCIN) infusion (for postpartum)  125 mL/hr    oxytocin (PITOCIN) injection 10 Units  10 Units    miSOPROStol (CYTOTEC) tablet 800 mcg  800 mcg    carboPROST (HEMABATE) injection 250 mcg  250 mcg    magnesium hydroxide (MILK OF MAGNESIA) suspension 30 mL  30 mL    prenatal plus vitamin (STUARTNATAL 1+1) 27-1 MG tablet 1 Tablet  1 Tablet    D5LR infusion      lactated ringers infusion         Recent Labs     08/28/22  0927   WBC 14.7*   RBC 3.76*   HEMOGLOBIN 10.3*   HEMATOCRIT 31.0*   MCV 82.4   MCH 27.4   MCHC 33.2*   RDW 41.2   PLATELETCT 263   MPV 12.5         Activity/ Discharge Instructions::   Discharge to home  Pelvic Rest x 6 weeks  No heavy lifting x4 weeks  Call or come to ED for: heavy vaginal bleeding, fever >100.4, severe abdominal pain, severe headache, chest pain, shortness of breath,  N/V, abnormal vaginal discharge,incisional drainage, or other concerns.       Follow up:  1-2wks for incision check, 5-6wks for PP visit.     Discharge Meds:      Medication List        START taking these medications        Instructions   ibuprofen 800 MG Tabs  Commonly known as: MOTRIN   Take 1 Tablet by mouth every 8 hours. Indications: Joint Damage causing Pain and Loss of Function  Dose: 800 mg     oxyCODONE immediate-release 5 MG Tabs  Commonly known as: ROXICODONE   Take 1 Tablet by mouth every four hours as needed for Severe Pain for up to 5 days.  Dose: 5 mg                 Annalee Crouch MD

## 2022-08-30 NOTE — CARE PLAN
The patient is Stable - Low risk of patient condition declining or worsening    Shift Goals  Clinical Goals: Pain management, ambulate, breast feed independently  Patient Goals: rest, bond with baby  Family Goals: support    Progress made toward(s) clinical / shift goals:  Patient reports adequate pain management with current medications. She is performing all ADLs independently and ambulating. MOB is able to latch infant independently.    Patient is not progressing towards the following goals:

## 2022-08-30 NOTE — DISCHARGE PLANNING
Meds-to-Beds: Discharge prescription orders listed below delivered to patient's bedside. RN Samira notified. Patient counseled.      Current Outpatient Medications   Medication Sig Dispense Refill    ibuprofen (MOTRIN) 800 MG Tab Take 1 Tablet by mouth every 8 hours. Indications: Joint Damage causing Pain and Loss of Function 30 Tablet 0    oxyCODONE immediate-release (ROXICODONE) 5 MG Tab Take 1 Tablet by mouth every four hours as needed for Severe Pain for up to 5 days. 20 Tablet 0      Yuly Mancera, PharmD

## 2022-08-30 NOTE — LACTATION NOTE
This note was copied from a baby's chart.  Mom is a 21 y/o P1 who delivered baby girl weighing   6 # 13.7 oz at 39. 3 wks. Mom reports darker and enlarged areolas during pregnancy. Mom denies any breast surgeries, diabetes, thyroid or fertility issues. Mom has a hx of depression and PCOS.   Mom states that last night baby screamed last night and wouldn't feed effectively. Mom eventually offered the pacifier for the night and swaddled baby because STS made her scream.   When LC arrived mom was preparing to feed baby. LC recommended that mom place baby STS for 15-20 minutes prior to feeding if possible. LC taught hand expression and encouraged mom to watch video and hand express onto nipples when baby is STS. Taught to observe for feeding cues and attempt a feed at that time.   Mom was considering being discharged today but might stay one more night to work on breastfeeding.  LC reviewed how to tell if baby is getting enough.  Baby was placed STS after first breast and started moving toward second side and baby latched well.   LC gave report to bedside RN regarding feeding. Baby will be bathed after breast feeding and placed back to STS. Encouraged mom to watch output more closely  LC encouraged mother to stay and work on more on breast feeding and gain confidence.   Mom does have a pump at home for personal use.   [facility] to [facility].  Mom has support paperwork and flyers.

## 2022-08-30 NOTE — CARE PLAN
The patient is Stable - Low risk of patient condition declining or worsening    Shift Goals  Clinical Goals: Maintain fundus firm, lochia light; pain management; pt to ambulate in hallways  Patient Goals: rest, bond with baby  Family Goals: support    Progress made toward(s) clinical / shift goals:  Fundus firm, lochia scant; pt reported pain relief with scheduled ibuprofen and tylenol and with prn oxycodone.

## 2022-08-31 VITALS
BODY MASS INDEX: 42.33 KG/M2 | SYSTOLIC BLOOD PRESSURE: 130 MMHG | HEART RATE: 82 BPM | WEIGHT: 230 LBS | TEMPERATURE: 97.8 F | RESPIRATION RATE: 18 BRPM | DIASTOLIC BLOOD PRESSURE: 79 MMHG | HEIGHT: 62 IN | OXYGEN SATURATION: 96 %

## 2022-08-31 PROCEDURE — 700102 HCHG RX REV CODE 250 W/ 637 OVERRIDE(OP): Performed by: OBSTETRICS & GYNECOLOGY

## 2022-08-31 PROCEDURE — A9270 NON-COVERED ITEM OR SERVICE: HCPCS | Performed by: OBSTETRICS & GYNECOLOGY

## 2022-08-31 RX ADMIN — IBUPROFEN 800 MG: 800 TABLET, FILM COATED ORAL at 08:29

## 2022-08-31 RX ADMIN — PRENATAL WITH FERROUS FUM AND FOLIC ACID 1 TABLET: 3080; 920; 120; 400; 22; 1.84; 3; 20; 10; 1; 12; 200; 27; 25; 2 TABLET ORAL at 08:28

## 2022-08-31 RX ADMIN — ACETAMINOPHEN 1000 MG: 500 TABLET ORAL at 08:28

## 2022-08-31 RX ADMIN — IBUPROFEN 800 MG: 800 TABLET, FILM COATED ORAL at 01:29

## 2022-08-31 RX ADMIN — DOCUSATE SODIUM 100 MG: 100 CAPSULE, LIQUID FILLED ORAL at 01:30

## 2022-08-31 RX ADMIN — ACETAMINOPHEN 1000 MG: 500 TABLET ORAL at 01:29

## 2022-08-31 ASSESSMENT — PAIN DESCRIPTION - PAIN TYPE
TYPE: ACUTE PAIN

## 2022-08-31 NOTE — PROGRESS NOTES
2000 Received report from DANITZA Hogan at change of shift. Patient assessed and POC discussed. Patient is resting in bed. Fundus firm, lochia light. Provided lactation help. Patient denies any needs at this time. Call light within reach, bed in lowest position. Patient is encouraged to call for pain/med interventions and any other needs.

## 2022-08-31 NOTE — PROGRESS NOTES
0712- Bedside report received from DANITZA Price.  Patient denied needs at this time.  0912- Patient assisted to latch infant.  1322- Patient assessment done.  Patient reported she is voiding without difficulty.  Patient stated she hasn't passed flatus since yesterday.  Patient encouraged to ambulate in the hallways minimum 4 times a day.  Patient given prune juice and apple juice.  Patient denied dizziness and reported that she is walking without difficulty.  Discussed pain management plan, to include scheduled tylenol and ibuprofen.  Reviewed plan of care.  Patient verbalized understanding.  1400- Patient ambulated in hallways with steady gait and tolerated well.  1500- Patient assisted to latch infant.  1720- Dr. Jones notified that patient desires to stay until tomorrow to work on breastfeeding.  Telephone order received to cancel discharge.

## 2022-08-31 NOTE — LACTATION NOTE
This note was copied from a baby's chart.  Follow up lactation visit : Mm reports she had a successful night with breast feeding. States that baby fed well and was more settled last night. Bilirubin has not increased over 12 hours. Mom is feeling better and more confident with feeds. Mom is preparing for discharge this morning and will follow up with pediatrician in the morning.  LC reviewed demand feeds of 8 or more times in 24 hours. Encouraged to keep baby STS during waking hours and observe for early feeding cues. Per request, mom was given more I&O sheets to document voids and stools. LC discussed stool color changes over next few days.  Mom has a [pump at home for personal use if needed. Mom has follow up information and referral was sent to [facility] for support.  Mom has no questions for LC .

## 2022-08-31 NOTE — DISCHARGE SUMMARY
DATE OF ADMISSION:  2022   DATE OF DISCHARGE:  2022     ADMITTING DIAGNOSIS:  Pregnancy at 39 and 2/7th weeks for induction of labor.     DISCHARGE DIAGNOSIS:  Pregnancy at 39 and 2/7th weeks for induction of labor,   status post primary low transverse  with gestational hypertension.     HOSPITAL COURSE IN DETAIL:  The patient was admitted on the aforementioned   date with the aforementioned diagnoses.  After two doses of Cytotec, she was   augmented with Pitocin, AROM clear and progressed to 9 cm, began to have   significant cervical swelling and subsequently a primary low transverse    was performed on the  without complication.  Findings include a   female infant with Apgars of 8 and 9.  The patient and baby recovery in stable   condition.  On postpartum day #1, she is doing well without complaint,   tolerating clears.  By postoperative day #2, she is tolerating regular diet.    On postop day #3, she wanted to work on breast feedings more.  On   postoperative day #4, she desired discharge home.  She is afebrile.     OBJECTIVE:    VITAL SIGNS:  Within normal limits.  ABDOMEN:  Soft with full fundus below the umbilicus.  Wound is clean, dry and   intact.  No erythema, induration or discharge.       ASSESSMENT:  At this time is postop day 4, status post primary low transverse   , beta strep negative with gestational hypertension.  Blood pressures   are not in the severe range, doing well, desires discharge home.     PLAN:    1.  Discharge home.  2.  Follow up in 2 weeks.  3.  Pelvic rest.  4.  Lift precautions.  5.  Scripts for Motrin and oxy, consented and written.        ______________________________  MD DONALD Crow/BRIGITTE    DD:  2022 05:45  DT:  2022 06:42    Job#:  [id number]

## 2022-08-31 NOTE — PROGRESS NOTES
0900 Assessment completed, fundus firm, lochia scant. ABD incision with mepilex silver dressing intact. Plan of care reviewed, verbalized understanding. Will call if pain med intervention needed.      1300 Discharge instructions reviewed, verbalized understanding, papers signed. Prescribed meds and information given, reviewed, verbalized understanding.      1405 Left facility escorted by staff.

## 2022-08-31 NOTE — LACTATION NOTE
This note was copied from a baby's chart.  Follow up assist : Baby fussy and FOB is swaddling baby  in blanket. LC suggested that mom put baby to breast. Mom reports that she thinks baby is gassy and in pain. LC discussed importance of keeping baby STS to calm as well as observe for early hungry cues. LC recommended frequent burping. Baby may have gas from crying as well.  Baby last fed at 3 pm and is rooting onto FOB 's shoulder. LC placed baby in FB hold on right side an baby latched easily with grasping breast in tea cup hold. Grandma reported hearing swallows from her chair. LC again reviewed keeping baby active during feeds and remaining STS during waking hours. Discussed cluster feeds briefly. Baby had serum bili drawn for elevated bili zap. Baby does appear slightly jaundice.  Lactation will follow up in the morning

## 2022-08-31 NOTE — CARE PLAN
The patient is Stable - Low risk of patient condition declining or worsening    Shift Goals  Clinical Goals: Maintain fundus firm, lochia light; pain management; pt to ambulate in hallways  Patient Goals: rest, bond with baby  Family Goals: support    Progress made toward(s) clinical / shift goals:  Fundus firm, lochia scant; pt reported relief with schedule ibuprofen and tylenol.  Patient ambulated hallways this shift.

## 2022-08-31 NOTE — PROGRESS NOTES
Hospital Day : 5    S: doing well breast feeding; no sx pih; desired dc    O:  Vitals:    08/29/22 0600 08/29/22 1800 08/30/22 0600 08/30/22 1800   BP: 132/87 139/89 132/80 134/89   Pulse: 78 80 86 86   Resp: 17 18 18 18   Temp: 36.3 °C (97.4 °F) 36.2 °C (97.1 °F) 36.4 °C (97.6 °F) 36.1 °C (97 °F)   TempSrc: Temporal Temporal Temporal Temporal   SpO2: 95%  97% 96%   Weight:       Height:           Recent Labs     08/28/22  0927   WBC 14.7*   RBC 3.76*   HEMOGLOBIN 10.3*   HEMATOCRIT 31.0*   MCV 82.4   MCH 27.4   MCHC 33.2*   RDW 41.2   PLATELETCT 263   MPV 12.5             Abd soft ff; cdi    A: pod4 sp 1ltcs; resolved gest htn; doing well    P: dc

## 2022-08-31 NOTE — CARE PLAN
The patient is Stable - Low risk of patient condition declining or worsening    Shift Goals  Clinical Goals: pain control, fundal involution, lochia WDL  Patient Goals: rest, bond with baby  Family Goals: support    Progress made toward(s) clinical / shift goals:    Problem: Knowledge Deficit - Postpartum  Goal: Patient will verbalize and demonstrate understanding of self and infant care  Outcome: Progressing  Note: Patient is engaged in self care and infant care education. Patient actively utilizes knowledge to care for self and infant.      Problem: Altered Physiologic Condition  Goal: Patient physiologically stable as evidenced by normal lochia, palpable uterine involution and vitals within normal limits  Outcome: Progressing  Note: Fundus firm and midline. Lochia scant, rubra. Vitals within defined limits        Patient is not progressing towards the following goals:

## 2022-08-31 NOTE — DISCHARGE INSTRUCTIONS

## 2022-10-24 ENCOUNTER — RESEARCH ENCOUNTER (OUTPATIENT)
Dept: MEDICAL GROUP | Facility: CLINIC | Age: 22
End: 2022-10-24

## 2022-10-24 DIAGNOSIS — Z00.6 RESEARCH STUDY PATIENT: ICD-10-CM

## 2022-12-29 ENCOUNTER — OFFICE VISIT (OUTPATIENT)
Dept: URGENT CARE | Facility: PHYSICIAN GROUP | Age: 22
End: 2022-12-29
Payer: COMMERCIAL

## 2022-12-29 VITALS
WEIGHT: 200 LBS | OXYGEN SATURATION: 90 % | RESPIRATION RATE: 16 BRPM | BODY MASS INDEX: 36.8 KG/M2 | TEMPERATURE: 97.4 F | DIASTOLIC BLOOD PRESSURE: 58 MMHG | SYSTOLIC BLOOD PRESSURE: 100 MMHG | HEIGHT: 62 IN | HEART RATE: 137 BPM

## 2022-12-29 DIAGNOSIS — R11.0 NAUSEA: ICD-10-CM

## 2022-12-29 DIAGNOSIS — R68.89 FLU-LIKE SYMPTOMS: ICD-10-CM

## 2022-12-29 DIAGNOSIS — R06.02 SOB (SHORTNESS OF BREATH): ICD-10-CM

## 2022-12-29 DIAGNOSIS — J02.9 SORE THROAT: ICD-10-CM

## 2022-12-29 DIAGNOSIS — J10.1 INFLUENZA A: ICD-10-CM

## 2022-12-29 LAB
FLUAV+FLUBV AG SPEC QL IA: ABNORMAL
INT CON NEG: ABNORMAL
INT CON NEG: NORMAL
INT CON POS: ABNORMAL
INT CON POS: NORMAL
S PYO AG THROAT QL: NEGATIVE

## 2022-12-29 PROCEDURE — 87804 INFLUENZA ASSAY W/OPTIC: CPT | Performed by: NURSE PRACTITIONER

## 2022-12-29 PROCEDURE — 99203 OFFICE O/P NEW LOW 30 MIN: CPT | Performed by: NURSE PRACTITIONER

## 2022-12-29 PROCEDURE — 87880 STREP A ASSAY W/OPTIC: CPT | Performed by: NURSE PRACTITIONER

## 2022-12-29 RX ORDER — OSELTAMIVIR PHOSPHATE 75 MG/1
75 CAPSULE ORAL 2 TIMES DAILY
Qty: 10 CAPSULE | Refills: 0 | Status: SHIPPED | OUTPATIENT
Start: 2022-12-29 | End: 2024-02-05

## 2022-12-29 RX ORDER — ONDANSETRON 4 MG/1
4 TABLET, ORALLY DISINTEGRATING ORAL EVERY 8 HOURS PRN
Qty: 10 TABLET | Refills: 0 | Status: SHIPPED | OUTPATIENT
Start: 2022-12-29 | End: 2024-02-05

## 2022-12-29 RX ORDER — ALBUTEROL SULFATE 90 UG/1
2 AEROSOL, METERED RESPIRATORY (INHALATION) EVERY 6 HOURS PRN
Qty: 8.5 G | Refills: 0 | Status: ON HOLD | OUTPATIENT
Start: 2022-12-29 | End: 2024-02-15

## 2022-12-29 ASSESSMENT — ENCOUNTER SYMPTOMS
COUGH: 1
WEAKNESS: 0
MYALGIAS: 1
NECK PAIN: 0
SHORTNESS OF BREATH: 1
HEADACHES: 0
CONSTIPATION: 0
SINUS PAIN: 0
CHILLS: 0
VOMITING: 0
ABDOMINAL PAIN: 0
FEVER: 1
EYE REDNESS: 0
NAUSEA: 1
WHEEZING: 0
EYE DISCHARGE: 0
DIARRHEA: 1
DIZZINESS: 0
CARDIOVASCULAR NEGATIVE: 1
SORE THROAT: 1

## 2022-12-29 ASSESSMENT — FIBROSIS 4 INDEX: FIB4 SCORE: 0.47

## 2022-12-29 NOTE — PROGRESS NOTES
Subjective     Velvet Mota is a 22 y.o. female who presents with Malaise (Cough, SOB, sore throat, fever, chills, body aches x 2 days)            HPI  States has been experiencing flulike symptoms x2 days.  Experiencing cough, shortness of breath, sore throat, body aches and fever. Taking Tylenol, last dose last night.  Currently breast-feeding.      PMH:  has a past medical history of Gynecological disorder.  MEDS:   Current Outpatient Medications:     ibuprofen (MOTRIN) 800 MG Tab, Take 1 Tablet by mouth every 8 hours. Indications: Joint Damage causing Pain and Loss of Function (Patient not taking: Reported on 2022), Disp: 30 Tablet, Rfl: 0  ALLERGIES:   Allergies   Allergen Reactions    Rocephin [Ceftriaxone] Rash     rash     SURGHX:   Past Surgical History:   Procedure Laterality Date    PRIMARY C SECTION Bilateral 2022    Procedure:  SECTION, PRIMARY;  Surgeon: Elsa Lane M.D.;  [facility];  Service: Labor and Delivery     SOCHX:  reports that she has never smoked. She has never used smokeless tobacco. She reports that she does not currently use alcohol. She reports that she does not currently use drugs after having used the following drugs: Inhaled.  FH: Family history was reviewed, no pertinent findings to report    Review of Systems   Constitutional:  Positive for fever and malaise/fatigue. Negative for chills.   HENT:  Positive for congestion and sore throat. Negative for ear pain and sinus pain.    Eyes:  Negative for discharge and redness.   Respiratory:  Positive for cough and shortness of breath. Negative for wheezing.    Cardiovascular: Negative.    Gastrointestinal:  Positive for diarrhea and nausea. Negative for abdominal pain, constipation and vomiting.   Musculoskeletal:  Positive for myalgias. Negative for neck pain.   Skin:  Negative for itching and rash.   Neurological:  Negative for dizziness, weakness and headaches.  "  Endo/Heme/Allergies:  Negative for environmental allergies.   All other systems reviewed and are negative.           Objective     /58   Pulse (!) 137   Temp 36.3 °C (97.4 °F) (Temporal)   Resp 16   Ht 1.575 m (5' 2\")   Wt 90.7 kg (200 lb)   SpO2 90%   BMI 36.58 kg/m²      Physical Exam  Vitals reviewed.   Constitutional:       General: She is awake. She is not in acute distress.     Appearance: She is not ill-appearing, toxic-appearing or diaphoretic.      Comments: Appears fatigued   HENT:      Head: Normocephalic.      Right Ear: Ear canal and external ear normal. A middle ear effusion is present.      Left Ear: Ear canal and external ear normal. A middle ear effusion is present.      Nose: Congestion and rhinorrhea present.      Mouth/Throat:      Lips: Pink.      Mouth: Mucous membranes are dry.      Pharynx: Uvula midline. Posterior oropharyngeal erythema present. No pharyngeal swelling, oropharyngeal exudate or uvula swelling.   Eyes:      Conjunctiva/sclera: Conjunctivae normal.   Cardiovascular:      Rate and Rhythm: Tachycardia present.   Pulmonary:      Effort: Pulmonary effort is normal.      Breath sounds: Normal breath sounds and air entry. No stridor, decreased air movement or transmitted upper airway sounds. No decreased breath sounds, wheezing, rhonchi or rales.   Skin:     General: Skin is warm and dry.   Neurological:      Mental Status: She is alert and oriented to person, place, and time.   Psychiatric:         Attention and Perception: Attention normal.         Mood and Affect: Mood normal.         Speech: Speech normal.         Behavior: Behavior normal. Behavior is cooperative.                           Assessment & Plan        1. Flu-like symptoms    - POCT Influenza A/B  - POCT Rapid Strep A    2. Sore throat    - POCT Influenza A/B  - POCT Rapid Strep A    3. Influenza A    - oseltamivir (TAMIFLU) 75 MG Cap; Take 1 Capsule by mouth 2 times a day.  Dispense: 10 Capsule; " Refill: 0    4. Nausea    - ondansetron (ZOFRAN ODT) 4 MG TABLET DISPERSIBLE; Take 1 Tablet by mouth every 8 hours as needed for Nausea/Vomiting.  Dispense: 10 Tablet; Refill: 0    5. SOB (shortness of breath)    - albuterol 108 (90 Base) MCG/ACT Aero Soln inhalation aerosol; Inhale 2 Puffs every 6 hours as needed for Shortness of Breath.  Dispense: 8.5 g; Refill: 0     -Maintain hydration/water intake  -May use over the counter Ibuprofen/Tylenol as needed for fever, body aches  -May use humidifier/vaporizer at home as needed for dry cough/nasal passages  -Gargle salt water or throat lozenges as needed for throat irritation/dry cough  -Get rest  -May use daily longer acting antihistamine as needed (no decongestant) for any post nasal drainage   -May use saline nasal spray/Flonase as needed to flush any nasal congestion/post nasal drainage   -May use over-the-counter Imodium as needed for diarrhea  -Recommend primarily liquid to bland diet  -Monitor for fevers, worse cough, phlegm, shortness of breath, wheeze, chest tightness- need re-evaluation

## 2023-09-07 ENCOUNTER — HOSPITAL ENCOUNTER (EMERGENCY)
Facility: MEDICAL CENTER | Age: 23
End: 2023-09-08
Attending: STUDENT IN AN ORGANIZED HEALTH CARE EDUCATION/TRAINING PROGRAM
Payer: MEDICAID

## 2023-09-07 DIAGNOSIS — Z34.92 NORMAL PREGNANCY IN SECOND TRIMESTER: ICD-10-CM

## 2023-09-07 DIAGNOSIS — R10.2 VAGINAL PAIN: ICD-10-CM

## 2023-09-07 LAB
ANION GAP SERPL CALC-SCNC: 13 MMOL/L (ref 7–16)
APPEARANCE UR: CLEAR
BASOPHILS # BLD AUTO: 0.3 % (ref 0–1.8)
BASOPHILS # BLD: 0.03 K/UL (ref 0–0.12)
BILIRUB UR QL STRIP.AUTO: NEGATIVE
BUN SERPL-MCNC: 5 MG/DL (ref 8–22)
CALCIUM SERPL-MCNC: 9 MG/DL (ref 8.5–10.5)
CHLORIDE SERPL-SCNC: 103 MMOL/L (ref 96–112)
CO2 SERPL-SCNC: 22 MMOL/L (ref 20–33)
COLOR UR: YELLOW
CREAT SERPL-MCNC: 0.4 MG/DL (ref 0.5–1.4)
EOSINOPHIL # BLD AUTO: 0.06 K/UL (ref 0–0.51)
EOSINOPHIL NFR BLD: 0.7 % (ref 0–6.9)
ERYTHROCYTE [DISTWIDTH] IN BLOOD BY AUTOMATED COUNT: 38.8 FL (ref 35.9–50)
GFR SERPLBLD CREATININE-BSD FMLA CKD-EPI: 142 ML/MIN/1.73 M 2
GLUCOSE SERPL-MCNC: 82 MG/DL (ref 65–99)
GLUCOSE UR STRIP.AUTO-MCNC: NEGATIVE MG/DL
HCG SERPL QL: POSITIVE
HCT VFR BLD AUTO: 40.6 % (ref 37–47)
HGB BLD-MCNC: 14.7 G/DL (ref 12–16)
IMM GRANULOCYTES # BLD AUTO: 0.01 K/UL (ref 0–0.11)
IMM GRANULOCYTES NFR BLD AUTO: 0.1 % (ref 0–0.9)
KETONES UR STRIP.AUTO-MCNC: NEGATIVE MG/DL
LEUKOCYTE ESTERASE UR QL STRIP.AUTO: NEGATIVE
LYMPHOCYTES # BLD AUTO: 3.06 K/UL (ref 1–4.8)
LYMPHOCYTES NFR BLD: 34.8 % (ref 22–41)
MCH RBC QN AUTO: 30.6 PG (ref 27–33)
MCHC RBC AUTO-ENTMCNC: 36.2 G/DL (ref 32.2–35.5)
MCV RBC AUTO: 84.4 FL (ref 81.4–97.8)
MICRO URNS: NORMAL
MONOCYTES # BLD AUTO: 0.63 K/UL (ref 0–0.85)
MONOCYTES NFR BLD AUTO: 7.2 % (ref 0–13.4)
NEUTROPHILS # BLD AUTO: 5 K/UL (ref 1.82–7.42)
NEUTROPHILS NFR BLD: 56.9 % (ref 44–72)
NITRITE UR QL STRIP.AUTO: NEGATIVE
NRBC # BLD AUTO: 0 K/UL
NRBC BLD-RTO: 0 /100 WBC (ref 0–0.2)
PH UR STRIP.AUTO: 5.5 [PH] (ref 5–8)
PLATELET # BLD AUTO: 368 K/UL (ref 164–446)
PMV BLD AUTO: 10.3 FL (ref 9–12.9)
POTASSIUM SERPL-SCNC: 3.5 MMOL/L (ref 3.6–5.5)
PROT UR QL STRIP: NEGATIVE MG/DL
RBC # BLD AUTO: 4.81 M/UL (ref 4.2–5.4)
RBC UR QL AUTO: NEGATIVE
SODIUM SERPL-SCNC: 138 MMOL/L (ref 135–145)
SP GR UR STRIP.AUTO: 1.01
UROBILINOGEN UR STRIP.AUTO-MCNC: 0.2 MG/DL
WBC # BLD AUTO: 8.8 K/UL (ref 4.8–10.8)

## 2023-09-07 PROCEDURE — 36415 COLL VENOUS BLD VENIPUNCTURE: CPT

## 2023-09-07 PROCEDURE — 87480 CANDIDA DNA DIR PROBE: CPT

## 2023-09-07 PROCEDURE — 99284 EMERGENCY DEPT VISIT MOD MDM: CPT

## 2023-09-07 PROCEDURE — 80048 BASIC METABOLIC PNL TOTAL CA: CPT

## 2023-09-07 PROCEDURE — 87591 N.GONORRHOEAE DNA AMP PROB: CPT

## 2023-09-07 PROCEDURE — 81003 URINALYSIS AUTO W/O SCOPE: CPT

## 2023-09-07 PROCEDURE — 85025 COMPLETE CBC W/AUTO DIFF WBC: CPT

## 2023-09-07 PROCEDURE — 87491 CHLMYD TRACH DNA AMP PROBE: CPT

## 2023-09-07 PROCEDURE — 87510 GARDNER VAG DNA DIR PROBE: CPT

## 2023-09-07 PROCEDURE — 87660 TRICHOMONAS VAGIN DIR PROBE: CPT

## 2023-09-07 PROCEDURE — 84703 CHORIONIC GONADOTROPIN ASSAY: CPT

## 2023-09-07 PROCEDURE — 84702 CHORIONIC GONADOTROPIN TEST: CPT

## 2023-09-07 ASSESSMENT — FIBROSIS 4 INDEX: FIB4 SCORE: 0.5

## 2023-09-08 ENCOUNTER — APPOINTMENT (OUTPATIENT)
Dept: RADIOLOGY | Facility: MEDICAL CENTER | Age: 23
End: 2023-09-08
Attending: STUDENT IN AN ORGANIZED HEALTH CARE EDUCATION/TRAINING PROGRAM
Payer: MEDICAID

## 2023-09-08 VITALS
HEIGHT: 63 IN | TEMPERATURE: 97 F | BODY MASS INDEX: 35.66 KG/M2 | HEART RATE: 87 BPM | WEIGHT: 201.28 LBS | SYSTOLIC BLOOD PRESSURE: 124 MMHG | OXYGEN SATURATION: 95 % | DIASTOLIC BLOOD PRESSURE: 64 MMHG | RESPIRATION RATE: 16 BRPM

## 2023-09-08 LAB
B-HCG SERPL-ACNC: ABNORMAL MIU/ML (ref 0–5)
C TRACH DNA GENITAL QL NAA+PROBE: NEGATIVE
CANDIDA DNA VAG QL PROBE+SIG AMP: NEGATIVE
G VAGINALIS DNA VAG QL PROBE+SIG AMP: NEGATIVE
N GONORRHOEA DNA GENITAL QL NAA+PROBE: NEGATIVE
SPECIMEN SOURCE: NORMAL
T VAGINALIS DNA VAG QL PROBE+SIG AMP: NEGATIVE

## 2023-09-08 PROCEDURE — 76801 OB US < 14 WKS SINGLE FETUS: CPT

## 2023-09-08 NOTE — DISCHARGE INSTRUCTIONS
You were seen in the emergency department for vaginal pain.  Your exam, labs are reassuring.  Ultrasound of your pregnancy reveals a live pregnancy at  16 weeks 4 days.  Please follow-up with our women's clinic, we wrote a referral so you should receive a phone call to help establish an appointment.

## 2023-09-08 NOTE — ED PROVIDER NOTES
"ED Provider Note    CHIEF COMPLAINT  Chief Complaint   Patient presents with    Vaginal Pain     Pt c/o constant vaginal pain that began a week ago, pt states she is pregnant, LMP or gestation age unknown, pt reports last OB visit was a month ago. Denies Vaginal bleeding or abdominal cramping.        EXTERNAL RECORDS REVIEWED  Inpatient Notes      HPI/ROS  LIMITATION TO HISTORY   Select: : None  OUTSIDE HISTORIAN(S):  none    Velvet Foss is a 23 y.o. female with no reported past medical history presenting to the emergency department for vaginal pain.  Patient says that she has had vaginal pain for approximately 1 month, describes pain as \" crabs pinching onto her vagina\" exacerbated when she holds her child.  Denies any low abdominal pain, cramping, fever, chills, dysuria, flank pain.  No vaginal discharge, bleeding.  No history of similar symptoms.  Patient is , last pregnancy was approximately 1 year ago, delivered by  section.    Has lost follow-up with OB/GYN due to insurance issues.        PAST MEDICAL HISTORY   has a past medical history of Gynecological disorder.    SURGICAL HISTORY   has a past surgical history that includes primary c section (Bilateral, 2022).    FAMILY HISTORY  History reviewed. No pertinent family history.    SOCIAL HISTORY  Social History     Tobacco Use    Smoking status: Never    Smokeless tobacco: Never   Vaping Use    Vaping Use: Never used   Substance and Sexual Activity    Alcohol use: Not Currently    Drug use: Not Currently     Types: Inhaled     Comment: HX THC not over past year 22    Sexual activity: Not on file       CURRENT MEDICATIONS  Home Medications    **Home medications have not yet been reviewed for this encounter**         ALLERGIES  Allergies   Allergen Reactions    Rocephin [Ceftriaxone] Rash     rash       PHYSICAL EXAM  VITAL SIGNS: /64   Pulse 87   Temp 36.1 °C (97 °F) (Temporal)   Resp 16   Ht 1.6 m (5' 3\")   Wt 91.3 kg " (201 lb 4.5 oz)   LMP  (LMP Unknown)   SpO2 95%   BMI 35.66 kg/m²    General: Well- appearing, non-toxic, no acute distress  Neuro: oriented x 3, moving all extremities.   Resp: clear to auscultation, no increased work of breathing  CV: Regular rate and rhythm  Abd: Soft, non-tender, non-distended  : Pelvic exam performed with chaperone, no adnexal tenderness, cervical motion tenderness.  Cervix is normal.  No significant discharge.  Extremities: No peripheral edema  Psych: lucid and conversational       DIAGNOSTIC STUDIES / PROCEDURES  EKG  No indication for EKG    LABS  Labs Reviewed   CBC WITH DIFFERENTIAL - Abnormal; Notable for the following components:       Result Value    MCHC 36.2 (*)     All other components within normal limits   BASIC METABOLIC PANEL - Abnormal; Notable for the following components:    Potassium 3.5 (*)     Bun 5 (*)     Creatinine 0.40 (*)     All other components within normal limits   HCG QUAL SERUM - Abnormal; Notable for the following components:    Beta-Hcg Qualitative Serum Positive (*)     All other components within normal limits   HCG QUANTITATIVE - Abnormal; Notable for the following components:    Bhcg 33997.0 (*)     All other components within normal limits   URINALYSIS    Narrative:     Release to patient->Immediate   CHLAMYDIA/GC, PCR (URINE)    Narrative:     Release to patient->Immediate   ESTIMATED GFR   VAGINAL PATHOGENS DNA PANEL    Narrative:     Release to patient->Immediate   CHLAMYDIA/GC, PCR (GENITAL/ANAL SWAB)    Narrative:     Release to patient->Immediate         RADIOLOGY  I have independently interpreted the diagnostic imaging associated with this visit and am waiting the final reading from the radiologist.   My preliminary interpretation is as follows: Live Intrauterine pregnancy  Radiologist interpretation:   US-OB 1ST TRIMESTER WITH TRANSVAGINAL (COMBO)   Final Result         1.  Live intrauterine pregnancy at calculated gestational age 16 weeks 4  days for estimated date of delivery February 19, 2024   2.  Subjectively low normal amniotic fluid volume, largest pocket measuring 4.6 cm.            COURSE & MEDICAL DECISION MAKING    ED Observation Status? Yes; I am placing the patient in to an observation status due to a diagnostic uncertainty as well as therapeutic intensity. Patient placed in observation status at 2130 AM, 9/7/2023.     Observation plan is as follows: Obtain labs, ultrasound of the pelvis, perform pelvic exam, monitor symptoms.    Upon Reevaluation, the patient's condition has: Improved; and will be discharged.    Patient discharged from ED Observation status at 0300 (Time) 9/8/23 (Date).     INITIAL ASSESSMENT, COURSE AND PLAN  Care Narrative:   This is a 23-year-old female presenting to the emergency department for vaginal pain.  Patient is well-appearing with reassuring vitals and overall normal physical exam.  Not sexually active reportedly.  No prior history of STI.  We will plan for baseline labs, urinalysis, pelvic exam.  Given patient's symptoms are strictly isolated to her vagina, not endorsing low pelvic/abdominal pain, will hold off on imaging until evaluated by pelvic exam.  Pelvic exam relatively unremarkable as described above.  hCG is positive.  Urinalysis is clean.  Swabs are unremarkable  Quantitative hCG markedly elevated  Ultrasound of the pelvis reveals a live intrauterine pregnancy, estimated gestational age 16 weeks 4 days.    At this time is appropriate for discharge home, return precaution discussed.            ADDITIONAL PROBLEM LIST    DISPOSITION AND DISCUSSIONS  I have discussed management of the patient with the following physicians and CADE's:      Discussion of management with other QHP or appropriate source(s):      Escalation of care considered, and ultimately not performed:    Barriers to care at this time, including but not limited to: .     Decision tools and prescription drugs considered including, but not  limited to: .    FINAL DIAGNOSIS  1. Vaginal pain    2. Normal pregnancy in second trimester           Electronically signed by: Jeison Camarillo D.O., 9/7/2023 10:51 PM

## 2023-09-08 NOTE — ED TRIAGE NOTES
Chief Complaint   Patient presents with    Vaginal Pain     Pt c/o constant vaginal pain that began a week ago, pt states she is pregnant, LMP or gestation age unknown, pt reports last OB visit was a month ago. Denies Vaginal bleeding or abdominal cramping.      Pt ambulatory to triage for above complaint.      Pt is alert/oriented and follows commands. Pt speaking in full sentences and responds appropriately to questions. No acute distress noted in triage and respirations are even and unlabored.     Pt placed in lobby and educated on triage process. Pt encouraged to alert staff for any changes in condition.

## 2023-10-24 ENCOUNTER — HOSPITAL ENCOUNTER (OUTPATIENT)
Dept: LAB | Facility: MEDICAL CENTER | Age: 23
End: 2023-10-24
Attending: OBSTETRICS & GYNECOLOGY
Payer: COMMERCIAL

## 2023-10-24 LAB
25(OH)D3 SERPL-MCNC: 33 NG/ML (ref 30–100)
ABO GROUP BLD: NORMAL
BASOPHILS # BLD AUTO: 0.2 % (ref 0–1.8)
BASOPHILS # BLD: 0.01 K/UL (ref 0–0.12)
BLD GP AB SCN SERPL QL: NORMAL
EOSINOPHIL # BLD AUTO: 0 K/UL (ref 0–0.51)
EOSINOPHIL NFR BLD: 0 % (ref 0–6.9)
ERYTHROCYTE [DISTWIDTH] IN BLOOD BY AUTOMATED COUNT: 46.5 FL (ref 35.9–50)
HBV SURFACE AG SER QL: NORMAL
HCT VFR BLD AUTO: 38.9 % (ref 37–47)
HCV AB SER QL: NORMAL
HGB BLD-MCNC: 12.9 G/DL (ref 12–16)
HIV 1+2 AB+HIV1 P24 AG SERPL QL IA: NORMAL
IMM GRANULOCYTES # BLD AUTO: 0.02 K/UL (ref 0–0.11)
IMM GRANULOCYTES NFR BLD AUTO: 0.4 % (ref 0–0.9)
LYMPHOCYTES # BLD AUTO: 1.1 K/UL (ref 1–4.8)
LYMPHOCYTES NFR BLD: 22.9 % (ref 22–41)
MCH RBC QN AUTO: 30.6 PG (ref 27–33)
MCHC RBC AUTO-ENTMCNC: 33.2 G/DL (ref 32.2–35.5)
MCV RBC AUTO: 92.2 FL (ref 81.4–97.8)
MONOCYTES # BLD AUTO: 0.54 K/UL (ref 0–0.85)
MONOCYTES NFR BLD AUTO: 11.2 % (ref 0–13.4)
NEUTROPHILS # BLD AUTO: 3.14 K/UL (ref 1.82–7.42)
NEUTROPHILS NFR BLD: 65.3 % (ref 44–72)
NRBC # BLD AUTO: 0 K/UL
NRBC BLD-RTO: 0 /100 WBC (ref 0–0.2)
PLATELET # BLD AUTO: 344 K/UL (ref 164–446)
PMV BLD AUTO: 10.2 FL (ref 9–12.9)
RBC # BLD AUTO: 4.22 M/UL (ref 4.2–5.4)
RH BLD: NORMAL
RUBV AB SER QL: 38.1 IU/ML
T PALLIDUM AB SER QL IA: NORMAL
WBC # BLD AUTO: 4.8 K/UL (ref 4.8–10.8)

## 2023-10-24 PROCEDURE — 82306 VITAMIN D 25 HYDROXY: CPT

## 2023-10-24 PROCEDURE — 87340 HEPATITIS B SURFACE AG IA: CPT

## 2023-10-24 PROCEDURE — 86787 VARICELLA-ZOSTER ANTIBODY: CPT

## 2023-10-24 PROCEDURE — 86901 BLOOD TYPING SEROLOGIC RH(D): CPT

## 2023-10-24 PROCEDURE — 87389 HIV-1 AG W/HIV-1&-2 AB AG IA: CPT

## 2023-10-24 PROCEDURE — 87086 URINE CULTURE/COLONY COUNT: CPT

## 2023-10-24 PROCEDURE — 86850 RBC ANTIBODY SCREEN: CPT

## 2023-10-24 PROCEDURE — 86780 TREPONEMA PALLIDUM: CPT

## 2023-10-24 PROCEDURE — 86900 BLOOD TYPING SEROLOGIC ABO: CPT

## 2023-10-24 PROCEDURE — 86803 HEPATITIS C AB TEST: CPT

## 2023-10-24 PROCEDURE — 86762 RUBELLA ANTIBODY: CPT

## 2023-10-24 PROCEDURE — 85025 COMPLETE CBC W/AUTO DIFF WBC: CPT

## 2023-10-26 LAB
BACTERIA UR CULT: NORMAL
SIGNIFICANT IND 70042: NORMAL
SITE SITE: NORMAL
SOURCE SOURCE: NORMAL
VZV IGG SER IA-ACNC: 153.8 IV
VZV IGM SER IA-ACNC: 0 ISR

## 2024-01-04 ENCOUNTER — HOSPITAL ENCOUNTER (OUTPATIENT)
Dept: LAB | Facility: MEDICAL CENTER | Age: 24
End: 2024-01-04
Attending: NURSE PRACTITIONER
Payer: COMMERCIAL

## 2024-01-04 LAB
25(OH)D3 SERPL-MCNC: 29 NG/ML (ref 30–100)
BASOPHILS # BLD AUTO: 0.3 % (ref 0–1.8)
BASOPHILS # BLD: 0.03 K/UL (ref 0–0.12)
EOSINOPHIL # BLD AUTO: 0.04 K/UL (ref 0–0.51)
EOSINOPHIL NFR BLD: 0.5 % (ref 0–6.9)
ERYTHROCYTE [DISTWIDTH] IN BLOOD BY AUTOMATED COUNT: 39.8 FL (ref 35.9–50)
GLUCOSE 1H P 50 G GLC PO SERPL-MCNC: 96 MG/DL (ref 70–139)
HCT VFR BLD AUTO: 31.9 % (ref 37–47)
HGB BLD-MCNC: 10.2 G/DL (ref 12–16)
IMM GRANULOCYTES # BLD AUTO: 0.03 K/UL (ref 0–0.11)
IMM GRANULOCYTES NFR BLD AUTO: 0.3 % (ref 0–0.9)
LYMPHOCYTES # BLD AUTO: 2.99 K/UL (ref 1–4.8)
LYMPHOCYTES NFR BLD: 34.3 % (ref 22–41)
MCH RBC QN AUTO: 28.2 PG (ref 27–33)
MCHC RBC AUTO-ENTMCNC: 32 G/DL (ref 32.2–35.5)
MCV RBC AUTO: 88.1 FL (ref 81.4–97.8)
MONOCYTES # BLD AUTO: 0.65 K/UL (ref 0–0.85)
MONOCYTES NFR BLD AUTO: 7.5 % (ref 0–13.4)
NEUTROPHILS # BLD AUTO: 4.98 K/UL (ref 1.82–7.42)
NEUTROPHILS NFR BLD: 57.1 % (ref 44–72)
NRBC # BLD AUTO: 0 K/UL
NRBC BLD-RTO: 0 /100 WBC (ref 0–0.2)
PLATELET # BLD AUTO: 342 K/UL (ref 164–446)
PMV BLD AUTO: 11 FL (ref 9–12.9)
RBC # BLD AUTO: 3.62 M/UL (ref 4.2–5.4)
T PALLIDUM AB SER QL IA: NORMAL
WBC # BLD AUTO: 8.7 K/UL (ref 4.8–10.8)

## 2024-01-04 PROCEDURE — 82306 VITAMIN D 25 HYDROXY: CPT

## 2024-01-04 PROCEDURE — 82950 GLUCOSE TEST: CPT

## 2024-01-04 PROCEDURE — 85025 COMPLETE CBC W/AUTO DIFF WBC: CPT

## 2024-01-04 PROCEDURE — 86780 TREPONEMA PALLIDUM: CPT

## 2024-01-07 ENCOUNTER — HOSPITAL ENCOUNTER (EMERGENCY)
Facility: MEDICAL CENTER | Age: 24
End: 2024-01-07
Attending: OBSTETRICS & GYNECOLOGY | Admitting: OBSTETRICS & GYNECOLOGY
Payer: COMMERCIAL

## 2024-01-07 VITALS
HEART RATE: 96 BPM | HEIGHT: 62 IN | WEIGHT: 214 LBS | SYSTOLIC BLOOD PRESSURE: 109 MMHG | TEMPERATURE: 97.2 F | DIASTOLIC BLOOD PRESSURE: 68 MMHG | OXYGEN SATURATION: 97 % | RESPIRATION RATE: 16 BRPM | BODY MASS INDEX: 39.38 KG/M2

## 2024-01-07 LAB
BACTERIA GENITAL QL WET PREP: NORMAL
SIGNIFICANT IND 70042: NORMAL
SITE SITE: NORMAL
SOURCE SOURCE: NORMAL

## 2024-01-07 PROCEDURE — 59025 FETAL NON-STRESS TEST: CPT

## 2024-01-07 PROCEDURE — 87491 CHLMYD TRACH DNA AMP PROBE: CPT

## 2024-01-07 PROCEDURE — 302449 STATCHG TRIAGE ONLY (STATISTIC)

## 2024-01-07 PROCEDURE — 87591 N.GONORRHOEAE DNA AMP PROB: CPT

## 2024-01-07 ASSESSMENT — PAIN SCALES - GENERAL: PAINLEVEL: 5 - MODERATE PAIN

## 2024-01-07 ASSESSMENT — FIBROSIS 4 INDEX: FIB4 SCORE: 0.38

## 2024-01-07 NOTE — PROGRESS NOTES
EDC  2023   EGA  34w0d    1227: TOCO/US applied, pt educated. Pt presents with c/o labial swelling/pain and right sided leg pain and swelling that started this morning around 0630. Pt states the labial pain is radiating down to her right leg. Pt uncomfortable at this. Pt pregnancy complicated by anemia, in which she is starting ferrous sulfate for, and pt states they haven't been able to view the fetal spine on US. POC discussed, all questions and concerns addressed.     1305: Dr. Tejada updated with patient's status, orders received.     1316: Vaginal swabs collected and sent to lab. SVE Ft/thick/high    1430: Report given to DANITZA Loya.

## 2024-01-08 LAB
C TRACH DNA GENITAL QL NAA+PROBE: NEGATIVE
N GONORRHOEA DNA GENITAL QL NAA+PROBE: NEGATIVE
SPECIMEN SOURCE: NORMAL

## 2024-01-08 NOTE — PROGRESS NOTES
1430 Report received from Huma BOSCH.    1700 Pt update given to Mallory PRESCOTT. Order received to D/C home.     1706  labor precautions reviewed, pt verbalized understanding of instructions. All questions and concerns addressed at this time. Pt ambulated off unit with belongings in hand in stable condition.

## 2024-01-26 ENCOUNTER — APPOINTMENT (OUTPATIENT)
Dept: ADMISSIONS | Facility: MEDICAL CENTER | Age: 24
End: 2024-01-26
Attending: OBSTETRICS & GYNECOLOGY
Payer: COMMERCIAL

## 2024-02-05 ENCOUNTER — PRE-ADMISSION TESTING (OUTPATIENT)
Dept: ADMISSIONS | Facility: MEDICAL CENTER | Age: 24
End: 2024-02-05
Attending: OBSTETRICS & GYNECOLOGY
Payer: COMMERCIAL

## 2024-02-05 RX ORDER — OMEPRAZOLE 20 MG/1
20 CAPSULE, DELAYED RELEASE ORAL DAILY
COMMUNITY

## 2024-02-13 ENCOUNTER — HOSPITAL ENCOUNTER (EMERGENCY)
Facility: MEDICAL CENTER | Age: 24
End: 2024-02-13
Attending: EMERGENCY MEDICINE
Payer: COMMERCIAL

## 2024-02-13 VITALS
OXYGEN SATURATION: 99 % | RESPIRATION RATE: 16 BRPM | SYSTOLIC BLOOD PRESSURE: 112 MMHG | WEIGHT: 221.78 LBS | HEART RATE: 98 BPM | TEMPERATURE: 97.8 F | DIASTOLIC BLOOD PRESSURE: 70 MMHG | BODY MASS INDEX: 40.81 KG/M2 | HEIGHT: 62 IN

## 2024-02-13 DIAGNOSIS — J06.9 UPPER RESPIRATORY TRACT INFECTION, UNSPECIFIED TYPE: ICD-10-CM

## 2024-02-13 LAB
FLUAV RNA SPEC QL NAA+PROBE: NEGATIVE
FLUBV RNA SPEC QL NAA+PROBE: NEGATIVE
RSV RNA SPEC QL NAA+PROBE: NEGATIVE
S PYO DNA SPEC NAA+PROBE: NOT DETECTED
SARS-COV-2 RNA RESP QL NAA+PROBE: NOTDETECTED

## 2024-02-13 PROCEDURE — 0241U HCHG SARS-COV-2 COVID-19 NFCT DS RESP RNA 4 TRGT ED POC: CPT

## 2024-02-13 PROCEDURE — A9270 NON-COVERED ITEM OR SERVICE: HCPCS | Mod: UD | Performed by: EMERGENCY MEDICINE

## 2024-02-13 PROCEDURE — 700102 HCHG RX REV CODE 250 W/ 637 OVERRIDE(OP): Mod: UD | Performed by: EMERGENCY MEDICINE

## 2024-02-13 PROCEDURE — 99284 EMERGENCY DEPT VISIT MOD MDM: CPT

## 2024-02-13 PROCEDURE — 87651 STREP A DNA AMP PROBE: CPT

## 2024-02-13 RX ORDER — ACETAMINOPHEN 325 MG/1
650 TABLET ORAL ONCE
Status: COMPLETED | OUTPATIENT
Start: 2024-02-13 | End: 2024-02-13

## 2024-02-13 RX ADMIN — ACETAMINOPHEN 650 MG: 325 TABLET, FILM COATED ORAL at 14:26

## 2024-02-13 ASSESSMENT — FIBROSIS 4 INDEX: FIB4 SCORE: 0.38

## 2024-02-13 ASSESSMENT — PAIN DESCRIPTION - PAIN TYPE: TYPE: ACUTE PAIN

## 2024-02-13 NOTE — ED NOTES
Discharge teaching and paperwork provided and all questions/concerns answered. VSS, assessment stable. Patient discharged to the care of self/significant other and ambulated out of the ED.

## 2024-02-13 NOTE — ED PROVIDER NOTES
"ED PHYSICIAN NOTE    CHIEF COMPLAINT  Chief Complaint   Patient presents with    Flu Like Symptoms     Since yesterday.        EXTERNAL RECORDS REVIEWED  Outpatient Notes patient followed by Dr. Cruz, OB/GYN    HPI/ROS      Velvet Portia Mota is a 23 y.o. female who presents with sore throat and cough.  Symptoms started yesterday.  Coughing up mucus.  No shortness of breath.  Has not had chest pain.  No hemoptysis leg swelling leg pain.  Denies abdominal pain or contractions.  No abnormal bleeding or fluid loss.  No headache leg swelling vision change photophobia.    PAST MEDICAL HISTORY  Past Medical History:   Diagnosis Date    Asthma 02/05/2024    childhoold- only with sports; has inhaler prn, but hasn't used in years    Gynecological disorder     2/5/2024 pt denies    Heart burn     Pregnant 02/05/2024    repeat c/section scheduled for 2/17/2024       SOCIAL HISTORY  Social History     Tobacco Use    Smoking status: Never     Passive exposure: Never    Smokeless tobacco: Never   Vaping Use    Vaping Use: Never used   Substance Use Topics    Alcohol use: Not Currently    Drug use: Not Currently     Types: Inhaled     Comment: HX THC not over past year 8/26/22       CURRENT MEDICATIONS  Home Medications       Reviewed by Rashmi Patel R.N. (Registered Nurse) on 02/13/24 at 1241  Med List Status: Partial     Medication Last Dose Status   albuterol 108 (90 Base) MCG/ACT Aero Soln inhalation aerosol  Active   omeprazole (PRILOSEC) 20 MG delayed-release capsule  Active   Prenatal Vit-Fe Fumarate-FA (PRENATAL VITAMIN PO)  Active                    ALLERGIES  Allergies   Allergen Reactions    Rocephin [Ceftriaxone] Rash     rash       PHYSICAL EXAM  VITAL SIGNS: /73   Pulse 100   Temp 36.2 °C (97.2 °F) (Temporal)   Resp 18   Ht 1.575 m (5' 2\")   Wt 101 kg (221 lb 12.5 oz)   LMP 05/14/2023   SpO2 97%   BMI 40.56 kg/m²    Constitutional: Awake and alert  HENT: Mild pharyngeal erythema.  " No exudate or asymmetry.  Airway widely patent.  Tympanic membranes are normal.  Eyes: Normal inspection  Neck: Grossly normal range of motion.  Cardiovascular: Normal heart rate, Normal rhythm.  Symmetric peripheral pulses.   Thorax & Lungs: No respiratory distress, No wheezing, No rales, No rhonchi, No chest tenderness.   Abdomen: Bowel sounds normal, soft, non-distended, nontender, gravid nontender uterus.  Bedside ultrasound shows normal fetal movement and heart rate  Skin: No rash.    DIAGNOSTIC STUDIES / PROCEDURES  LABS/EKG  Results for orders placed or performed during the hospital encounter of 02/13/24   Group A Strep by PCR    Specimen: Throat   Result Value Ref Range    Group A Strep by PCR Not Detected Not Detected   POC CoV-2, FLU A/B, RSV by PCR   Result Value Ref Range    POC Influenza A RNA, PCR Negative Negative    POC Influenza B RNA, PCR Negative Negative    POC RSV, by PCR Negative Negative    POC SARS-CoV-2, PCR NotDetected        COURSE & MEDICAL DECISION MAKING    INITIAL ASSESSMENT, COURSE AND PLAN  Care Narrative: Patient presents with URI symptoms.  Vital signs are stable.  No hypoxia.  No pulmonary findings on examination.  She has mild pharyngeal erythema without evidence of abscess or airway compromise.  No clinical suggestion of epiglottitis.  No suggestion of systemic illness or toxicity.  Initial blood pressure was mildly elevated.  On recheck it is normal.      Strep screen normal.  Viral panel is negative.    Patient has an unspecified viral illness.  given gestational age of pregnancy contact OB/GYN.          DISPOSITION AND DISCUSSIONS  I have discussed management of the patient with the following physicians and CADE's: Dr. rayo.  Agrees with symptomatic management      Prescription drugs considered and/or prescribed: Considered antibiotics but there is no evidence of bacterial infection    FINAL IMPRESSION  1.  Viral upper respiratory infection    This dictation was created  using voice recognition software. The accuracy of the dictation is limited to the abilities of the software. I expect there may be some errors of grammar and possibly content. The nursing notes were reviewed and certain aspects of this information were incorporated into this note.    Electronically signed by: Aj Worley M.D., 2/13/2024       Methotrexate Pregnancy And Lactation Text: This medication is Pregnancy Category X and is known to cause fetal harm. This medication is excreted in breast milk.

## 2024-02-15 ENCOUNTER — ANESTHESIA EVENT (OUTPATIENT)
Dept: OBGYN | Facility: MEDICAL CENTER | Age: 24
End: 2024-02-15
Payer: COMMERCIAL

## 2024-02-15 ENCOUNTER — ANESTHESIA (OUTPATIENT)
Dept: OBGYN | Facility: MEDICAL CENTER | Age: 24
End: 2024-02-15
Payer: COMMERCIAL

## 2024-02-15 ENCOUNTER — HOSPITAL ENCOUNTER (INPATIENT)
Facility: MEDICAL CENTER | Age: 24
LOS: 4 days | End: 2024-02-19
Attending: OBSTETRICS & GYNECOLOGY | Admitting: OBSTETRICS & GYNECOLOGY
Payer: COMMERCIAL

## 2024-02-15 DIAGNOSIS — G89.18 POST-OP PAIN: ICD-10-CM

## 2024-02-15 LAB
ABO GROUP BLD: NORMAL
BASOPHILS # BLD AUTO: 0.4 % (ref 0–1.8)
BASOPHILS # BLD: 0.03 K/UL (ref 0–0.12)
BLD GP AB SCN SERPL QL: NORMAL
EOSINOPHIL # BLD AUTO: 0.02 K/UL (ref 0–0.51)
EOSINOPHIL NFR BLD: 0.3 % (ref 0–6.9)
ERYTHROCYTE [DISTWIDTH] IN BLOOD BY AUTOMATED COUNT: 39 FL (ref 35.9–50)
HCT VFR BLD AUTO: 28.3 % (ref 37–47)
HGB BLD-MCNC: 9.1 G/DL (ref 12–16)
HOLDING TUBE BB 8507: NORMAL
IMM GRANULOCYTES # BLD AUTO: 0.06 K/UL (ref 0–0.11)
IMM GRANULOCYTES NFR BLD AUTO: 0.9 % (ref 0–0.9)
LYMPHOCYTES # BLD AUTO: 2.49 K/UL (ref 1–4.8)
LYMPHOCYTES NFR BLD: 36.8 % (ref 22–41)
MCH RBC QN AUTO: 25.5 PG (ref 27–33)
MCHC RBC AUTO-ENTMCNC: 32.2 G/DL (ref 32.2–35.5)
MCV RBC AUTO: 79.3 FL (ref 81.4–97.8)
MONOCYTES # BLD AUTO: 0.42 K/UL (ref 0–0.85)
MONOCYTES NFR BLD AUTO: 6.2 % (ref 0–13.4)
NEUTROPHILS # BLD AUTO: 3.74 K/UL (ref 1.82–7.42)
NEUTROPHILS NFR BLD: 55.4 % (ref 44–72)
NRBC # BLD AUTO: 0.02 K/UL
NRBC BLD-RTO: 0.3 /100 WBC (ref 0–0.2)
PLATELET # BLD AUTO: 326 K/UL (ref 164–446)
PMV BLD AUTO: 12 FL (ref 9–12.9)
RBC # BLD AUTO: 3.57 M/UL (ref 4.2–5.4)
RH BLD: NORMAL
T PALLIDUM AB SER QL IA: NORMAL
WBC # BLD AUTO: 6.8 K/UL (ref 4.8–10.8)

## 2024-02-15 PROCEDURE — 700111 HCHG RX REV CODE 636 W/ 250 OVERRIDE (IP): Mod: JZ | Performed by: ANESTHESIOLOGY

## 2024-02-15 PROCEDURE — 36415 COLL VENOUS BLD VENIPUNCTURE: CPT

## 2024-02-15 PROCEDURE — 700111 HCHG RX REV CODE 636 W/ 250 OVERRIDE (IP): Performed by: OBSTETRICS & GYNECOLOGY

## 2024-02-15 PROCEDURE — 700105 HCHG RX REV CODE 258: Performed by: OBSTETRICS & GYNECOLOGY

## 2024-02-15 PROCEDURE — 700102 HCHG RX REV CODE 250 W/ 637 OVERRIDE(OP): Performed by: ANESTHESIOLOGY

## 2024-02-15 PROCEDURE — A9270 NON-COVERED ITEM OR SERVICE: HCPCS | Performed by: ANESTHESIOLOGY

## 2024-02-15 PROCEDURE — 86780 TREPONEMA PALLIDUM: CPT

## 2024-02-15 PROCEDURE — 770002 HCHG ROOM/CARE - OB PRIVATE (112)

## 2024-02-15 PROCEDURE — 700105 HCHG RX REV CODE 258: Performed by: ANESTHESIOLOGY

## 2024-02-15 PROCEDURE — 85025 COMPLETE CBC W/AUTO DIFF WBC: CPT

## 2024-02-15 RX ORDER — AZITHROMYCIN 500 MG/5ML
500 INJECTION, POWDER, LYOPHILIZED, FOR SOLUTION INTRAVENOUS ONCE
Status: DISCONTINUED | OUTPATIENT
Start: 2024-02-15 | End: 2024-02-15

## 2024-02-15 RX ORDER — CLINDAMYCIN PHOSPHATE 900 MG/50ML
900 INJECTION, SOLUTION INTRAVENOUS
Status: COMPLETED | OUTPATIENT
Start: 2024-02-15 | End: 2024-02-15

## 2024-02-15 RX ORDER — SODIUM CHLORIDE, SODIUM LACTATE, POTASSIUM CHLORIDE, CALCIUM CHLORIDE 600; 310; 30; 20 MG/100ML; MG/100ML; MG/100ML; MG/100ML
INJECTION, SOLUTION INTRAVENOUS CONTINUOUS
Status: DISCONTINUED | OUTPATIENT
Start: 2024-02-15 | End: 2024-02-16

## 2024-02-15 RX ORDER — METOCLOPRAMIDE HYDROCHLORIDE 5 MG/ML
10 INJECTION INTRAMUSCULAR; INTRAVENOUS ONCE
Status: COMPLETED | OUTPATIENT
Start: 2024-02-15 | End: 2024-02-15

## 2024-02-15 RX ORDER — SODIUM CHLORIDE, SODIUM GLUCONATE, SODIUM ACETATE, POTASSIUM CHLORIDE AND MAGNESIUM CHLORIDE 526; 502; 368; 37; 30 MG/100ML; MG/100ML; MG/100ML; MG/100ML; MG/100ML
1500 INJECTION, SOLUTION INTRAVENOUS ONCE
Status: COMPLETED | OUTPATIENT
Start: 2024-02-15 | End: 2024-02-16

## 2024-02-15 RX ORDER — CITRIC ACID/SODIUM CITRATE 334-500MG
30 SOLUTION, ORAL ORAL ONCE
Status: COMPLETED | OUTPATIENT
Start: 2024-02-15 | End: 2024-02-15

## 2024-02-15 RX ADMIN — SODIUM CHLORIDE, SODIUM GLUCONATE, SODIUM ACETATE, POTASSIUM CHLORIDE AND MAGNESIUM CHLORIDE 1500 ML: 526; 502; 368; 37; 30 INJECTION, SOLUTION INTRAVENOUS at 23:00

## 2024-02-15 RX ADMIN — GENTAMICIN SULFATE 380 MG: 40 INJECTION, SOLUTION INTRAMUSCULAR; INTRAVENOUS at 17:10

## 2024-02-15 RX ADMIN — SODIUM CITRATE AND CITRIC ACID MONOHYDRATE 30 ML: 334; 500 SOLUTION ORAL at 23:56

## 2024-02-15 RX ADMIN — METOCLOPRAMIDE 10 MG: 5 INJECTION, SOLUTION INTRAMUSCULAR; INTRAVENOUS at 23:56

## 2024-02-15 RX ADMIN — FAMOTIDINE 20 MG: 10 INJECTION, SOLUTION INTRAVENOUS at 23:55

## 2024-02-15 RX ADMIN — CLINDAMYCIN IN 5 PERCENT DEXTROSE 900 MG: 18 INJECTION, SOLUTION INTRAVENOUS at 18:42

## 2024-02-15 ASSESSMENT — LIFESTYLE VARIABLES
AVERAGE NUMBER OF DAYS PER WEEK YOU HAVE A DRINK CONTAINING ALCOHOL: 0
EVER_SMOKED: NEVER
TOTAL SCORE: 0
ALCOHOL_USE: NO
TOTAL SCORE: 0
HAVE YOU EVER FELT YOU SHOULD CUT DOWN ON YOUR DRINKING: NO
EVER HAD A DRINK FIRST THING IN THE MORNING TO STEADY YOUR NERVES TO GET RID OF A HANGOVER: NO
ON A TYPICAL DAY WHEN YOU DRINK ALCOHOL HOW MANY DRINKS DO YOU HAVE: 0
HOW MANY TIMES IN THE PAST YEAR HAVE YOU HAD 5 OR MORE DRINKS IN A DAY: 0
CONSUMPTION TOTAL: NEGATIVE
DOES PATIENT WANT TO STOP DRINKING: NO
EVER FELT BAD OR GUILTY ABOUT YOUR DRINKING: NO
HAVE PEOPLE ANNOYED YOU BY CRITICIZING YOUR DRINKING: NO
TOTAL SCORE: 0

## 2024-02-15 ASSESSMENT — PATIENT HEALTH QUESTIONNAIRE - PHQ9
SUM OF ALL RESPONSES TO PHQ QUESTIONS 1-9: 7
9. THOUGHTS THAT YOU WOULD BE BETTER OFF DEAD, OR OF HURTING YOURSELF: NOT AT ALL
1. LITTLE INTEREST OR PLEASURE IN DOING THINGS: SEVERAL DAYS
3. TROUBLE FALLING OR STAYING ASLEEP OR SLEEPING TOO MUCH: MORE THAN HALF THE DAYS
2. FEELING DOWN, DEPRESSED, IRRITABLE, OR HOPELESS: SEVERAL DAYS
7. TROUBLE CONCENTRATING ON THINGS, SUCH AS READING THE NEWSPAPER OR WATCHING TELEVISION: NOT AT ALL
5. POOR APPETITE OR OVEREATING: SEVERAL DAYS
8. MOVING OR SPEAKING SO SLOWLY THAT OTHER PEOPLE COULD HAVE NOTICED. OR THE OPPOSITE, BEING SO FIGETY OR RESTLESS THAT YOU HAVE BEEN MOVING AROUND A LOT MORE THAN USUAL: NOT AT ALL
6. FEELING BAD ABOUT YOURSELF - OR THAT YOU ARE A FAILURE OR HAVE LET YOURSELF OR YOUR FAMILY DOWN: NOT AL ALL
4. FEELING TIRED OR HAVING LITTLE ENERGY: MORE THAN HALF THE DAYS
SUM OF ALL RESPONSES TO PHQ9 QUESTIONS 1 AND 2: 2

## 2024-02-15 ASSESSMENT — PAIN SCALES - GENERAL: PAINLEVEL: 0 - NO PAIN

## 2024-02-15 ASSESSMENT — FIBROSIS 4 INDEX: FIB4 SCORE: 0.38

## 2024-02-15 NOTE — H&P
Obstetrics and Gynecology  Labor and Delivery History and Physical    Date of Admission: 2/15/2024      ID: 23 y.o.  with IUP at 39w4d on date of admission    Attending OB: Ray Valentin M.D.    CC: Variable decelerations in the office    HPI: Velvet Mota is a 23 y.o.  at 39w4d on date of admission by 9 wk U/S, who presents for variable decelerations in the office.  The patient was undergoing weekly antepartum testing starting at 37 weeks for history of obesity.  She was originally scheduled for low transverse  section for repeat in 2 days.  However, given the variables seen on the monitor in the office today the recommendation is to move forward with delivery as there is no benefit to continuing pregnancy in the setting of an abnormal antepartum testing. She endorses good fetal movement, and denies loss of fluid, vaginal bleeding, or decreased fetal movement.  She again reaffirms her desire for permanent sterility.    ROS: 10 systems reviewed and negative except as noted above.    Obstetric History   OB History    Para Term  AB Living   2 1 1     1   SAB IAB Ectopic Molar Multiple Live Births           0 1      # Outcome Date GA Lbr Austyn/2nd Weight Sex Delivery Anes PTL Lv   2 Current            1 Term 22 39w3d  3.11 kg (6 lb 13.7 oz) F CS-LTranv EPI, Spinal N NAVARRO      Complications: Failure to Progress in Second Stage         Past Medical History  Surgical History   Obesity  PCOS  Depression  section -       Gynecologic History  Social History   Irregular menses prior to pregnancy, secondary to PCOS  Denies Hx of STIs Tobacco: denies  EtOH: denies  Street Drugs: denies  Works as a [employer]      Medications  Allergies   PNV Rocephin [ceftriaxone] - rash     Family Medical History   Family History   Problem Relation Age of Onset    Multiple myeloma Mother     Pancreatic Cancer Father     Scoliosis Brother           O: /56   Pulse 90    "Temp 36.4 °C (97.6 °F) (Temporal)   Resp 17   Ht 1.575 m (5' 2\")   Wt 102 kg (224 lb)   LMP 2023   BMI 40.97 kg/m²    Gen: NAD, AAO  Abd: Gravid, NTTP,Cephalic by Leopolds, No rebound or guarding  Ext: NTTP, tr edema, 2+DPP  Pelvic: SVE deferred    NST:     Prenatal labs:   Lab  Result    Rh  positive    Antibody screen  negative    Rubella  immune    HIV  Non-reactive    RPR  Non-reactive    HBsAg  negative    HCV Ab  Non-reactive    Varicella  immune    Urine Culture  Usual urogenital magalie >100,000 cfu/mL    Gonorrhea/chlamydia  neg/neg    Aneuploidy screening  cffDNA low risk    1h Glucose  96 wnl    GBS  negative       A/P: Velvet Mota is a 23 y.o.  at 39w4d on date of admission by 9 wk U/S who presents for abnormal antepartum testing (variables on NST).  Given the fact that she is 39 weeks it was recommended that she proceed today instead of in 2 days for low transverse  section for repeat.  She is also planning a bilateral salpingectomy for sterilization and is firm in this decision..  AVSS.    *Admit to L&D  *IV, CBC, T&S on hold  *Anesthesia consult  *Gent/clinda antibiotic prophylaxis  *Hospital surgical consent  *NPO  *Global: Rh+, RubImm, GBS neg.     Discussed with the patient indications for  delivery. The patient voiced understanding of indications for  section at this time.    Discussed with the patient the risks of  delivery. Our conversation included the risks of such major surgery include which included but was not limited to infection, bleeding, blood clots, scarring, damage to other organs in the area of operation. Specifically organs that can be damaged are bowel, bladder, blood vessels, nerves, uterus, fallopian tubes, ovaries and ureters.  I also discussed with the patient the risk of wound infection and wound breakdown. I explaned that these risks are greater in people that have diabetes or obesity.  We discussed the risk " of emergency blood transfusion during procedure as well as emergency hysterectomy during procedure.  We also discussed the increased risk abnormal placental implantation and possible need for repeat  section in future pregnancies.  The patient verbalized her understanding of these risks.    The patient again reaffirms her desire for bilateral salpingectomy.  She expressed understanding that salpingectomy is a permanent procedure and the patient will no longer be able to bear children once completed. I discussed other forms of birth control which include pills, barrier methods, injection, implant, IUD, hysteroscopic tubal occlusion and male sterilization. We discussed that pills, injections, implants, and IUDs are nonpermanent nor are they surgical. I also discussed the risk of tubal failure with the patient. We discussed that should the tubal fail there is a risk for ectopic pregnancy which may require surgical intervention.    Patient had the opportunity to ask questions regarding procedures. All questions answered to the patient's satisfaction.    Proceed with  delivery and bilateral salpingectomy    Ray Valentin M.D.,  2/15/2024, 3:52 PM

## 2024-02-15 NOTE — PROGRESS NOTES
22yo, , edc , pt presents from office. Pt denies LOF, vag bleeding. POS fm. EFM and Rentz placed. VSS.    MD notified.  PPH assessment reviewed. Dr. Valentin notified. Orders received.   Report to Mary Jane BOSCH

## 2024-02-16 LAB
ERYTHROCYTE [DISTWIDTH] IN BLOOD BY AUTOMATED COUNT: 40 FL (ref 35.9–50)
HCT VFR BLD AUTO: 28.7 % (ref 37–47)
HGB BLD-MCNC: 8.9 G/DL (ref 12–16)
MCH RBC QN AUTO: 25.3 PG (ref 27–33)
MCHC RBC AUTO-ENTMCNC: 31 G/DL (ref 32.2–35.5)
MCV RBC AUTO: 81.5 FL (ref 81.4–97.8)
PATHOLOGY CONSULT NOTE: NORMAL
PLATELET # BLD AUTO: 310 K/UL (ref 164–446)
PMV BLD AUTO: 11.6 FL (ref 9–12.9)
RBC # BLD AUTO: 3.52 M/UL (ref 4.2–5.4)
WBC # BLD AUTO: 9.3 K/UL (ref 4.8–10.8)

## 2024-02-16 PROCEDURE — 700111 HCHG RX REV CODE 636 W/ 250 OVERRIDE (IP): Mod: JZ | Performed by: OBSTETRICS & GYNECOLOGY

## 2024-02-16 PROCEDURE — 85027 COMPLETE CBC AUTOMATED: CPT

## 2024-02-16 PROCEDURE — 160041 HCHG SURGERY MINUTES - EA ADDL 1 MIN LEVEL 4: Performed by: OBSTETRICS & GYNECOLOGY

## 2024-02-16 PROCEDURE — 700105 HCHG RX REV CODE 258: Performed by: ANESTHESIOLOGY

## 2024-02-16 PROCEDURE — A9270 NON-COVERED ITEM OR SERVICE: HCPCS | Performed by: ANESTHESIOLOGY

## 2024-02-16 PROCEDURE — C1755 CATHETER, INTRASPINAL: HCPCS | Performed by: OBSTETRICS & GYNECOLOGY

## 2024-02-16 PROCEDURE — 700111 HCHG RX REV CODE 636 W/ 250 OVERRIDE (IP): Mod: JZ | Performed by: ANESTHESIOLOGY

## 2024-02-16 PROCEDURE — 88302 TISSUE EXAM BY PATHOLOGIST: CPT

## 2024-02-16 PROCEDURE — 160029 HCHG SURGERY MINUTES - 1ST 30 MINS LEVEL 4: Performed by: OBSTETRICS & GYNECOLOGY

## 2024-02-16 PROCEDURE — 700102 HCHG RX REV CODE 250 W/ 637 OVERRIDE(OP): Performed by: OBSTETRICS & GYNECOLOGY

## 2024-02-16 PROCEDURE — 36415 COLL VENOUS BLD VENIPUNCTURE: CPT

## 2024-02-16 PROCEDURE — 160009 HCHG ANES TIME/MIN: Performed by: OBSTETRICS & GYNECOLOGY

## 2024-02-16 PROCEDURE — 0UB70ZZ EXCISION OF BILATERAL FALLOPIAN TUBES, OPEN APPROACH: ICD-10-PCS | Performed by: OBSTETRICS & GYNECOLOGY

## 2024-02-16 PROCEDURE — 700105 HCHG RX REV CODE 258: Performed by: OBSTETRICS & GYNECOLOGY

## 2024-02-16 PROCEDURE — 770002 HCHG ROOM/CARE - OB PRIVATE (112)

## 2024-02-16 PROCEDURE — 160002 HCHG RECOVERY MINUTES (STAT): Performed by: OBSTETRICS & GYNECOLOGY

## 2024-02-16 PROCEDURE — 160048 HCHG OR STATISTICAL LEVEL 1-5: Performed by: OBSTETRICS & GYNECOLOGY

## 2024-02-16 PROCEDURE — A9270 NON-COVERED ITEM OR SERVICE: HCPCS | Performed by: OBSTETRICS & GYNECOLOGY

## 2024-02-16 PROCEDURE — 700102 HCHG RX REV CODE 250 W/ 637 OVERRIDE(OP): Performed by: ANESTHESIOLOGY

## 2024-02-16 PROCEDURE — 160035 HCHG PACU - 1ST 60 MINS PHASE I: Performed by: OBSTETRICS & GYNECOLOGY

## 2024-02-16 PROCEDURE — 51798 US URINE CAPACITY MEASURE: CPT

## 2024-02-16 RX ORDER — VITAMIN A ACETATE, BETA CAROTENE, ASCORBIC ACID, CHOLECALCIFEROL, .ALPHA.-TOCOPHEROL ACETATE, DL-, THIAMINE MONONITRATE, RIBOFLAVIN, NIACINAMIDE, PYRIDOXINE HYDROCHLORIDE, FOLIC ACID, CYANOCOBALAMIN, CALCIUM CARBONATE, FERROUS FUMARATE, ZINC OXIDE, CUPRIC OXIDE 3080; 12; 120; 400; 1; 1.84; 3; 20; 22; 920; 25; 200; 27; 10; 2 [IU]/1; UG/1; MG/1; [IU]/1; MG/1; MG/1; MG/1; MG/1; MG/1; [IU]/1; MG/1; MG/1; MG/1; MG/1; MG/1
1 TABLET, FILM COATED ORAL
Status: DISCONTINUED | OUTPATIENT
Start: 2024-02-16 | End: 2024-02-19 | Stop reason: HOSPADM

## 2024-02-16 RX ORDER — OXYCODONE HYDROCHLORIDE 10 MG/1
10 TABLET ORAL EVERY 4 HOURS PRN
Status: DISPENSED | OUTPATIENT
Start: 2024-02-16 | End: 2024-02-17

## 2024-02-16 RX ORDER — PHENYLEPHRINE HYDROCHLORIDE 10 MG/ML
INJECTION, SOLUTION INTRAMUSCULAR; INTRAVENOUS; SUBCUTANEOUS PRN
Status: DISCONTINUED | OUTPATIENT
Start: 2024-02-16 | End: 2024-02-16 | Stop reason: SURG

## 2024-02-16 RX ORDER — IBUPROFEN 800 MG/1
800 TABLET ORAL EVERY 8 HOURS PRN
Status: DISCONTINUED | OUTPATIENT
Start: 2024-02-19 | End: 2024-02-19 | Stop reason: HOSPADM

## 2024-02-16 RX ORDER — OXYTOCIN 10 [USP'U]/ML
10 INJECTION, SOLUTION INTRAMUSCULAR; INTRAVENOUS
Status: DISCONTINUED | OUTPATIENT
Start: 2024-02-16 | End: 2024-02-16

## 2024-02-16 RX ORDER — DIPHENHYDRAMINE HYDROCHLORIDE 50 MG/ML
12.5 INJECTION INTRAMUSCULAR; INTRAVENOUS EVERY 6 HOURS PRN
Status: DISCONTINUED | OUTPATIENT
Start: 2024-02-16 | End: 2024-02-16

## 2024-02-16 RX ORDER — MORPHINE SULFATE 0.5 MG/ML
INJECTION, SOLUTION EPIDURAL; INTRATHECAL; INTRAVENOUS
Status: COMPLETED | OUTPATIENT
Start: 2024-02-16 | End: 2024-02-16

## 2024-02-16 RX ORDER — MIDAZOLAM HYDROCHLORIDE 1 MG/ML
INJECTION INTRAMUSCULAR; INTRAVENOUS PRN
Status: DISCONTINUED | OUTPATIENT
Start: 2024-02-16 | End: 2024-02-16 | Stop reason: SURG

## 2024-02-16 RX ORDER — HALOPERIDOL 5 MG/ML
1 INJECTION INTRAMUSCULAR
Status: DISCONTINUED | OUTPATIENT
Start: 2024-02-16 | End: 2024-02-16 | Stop reason: HOSPADM

## 2024-02-16 RX ORDER — OXYCODONE HYDROCHLORIDE 5 MG/1
10 TABLET ORAL EVERY 4 HOURS PRN
Status: DISCONTINUED | OUTPATIENT
Start: 2024-02-17 | End: 2024-02-19 | Stop reason: HOSPADM

## 2024-02-16 RX ORDER — BISACODYL 10 MG
10 SUPPOSITORY, RECTAL RECTAL PRN
Status: DISCONTINUED | OUTPATIENT
Start: 2024-02-16 | End: 2024-02-18

## 2024-02-16 RX ORDER — METHYLERGONOVINE MALEATE 0.2 MG/ML
0.2 INJECTION INTRAVENOUS
Status: DISCONTINUED | OUTPATIENT
Start: 2024-02-16 | End: 2024-02-16

## 2024-02-16 RX ORDER — ONDANSETRON 2 MG/ML
4 INJECTION INTRAMUSCULAR; INTRAVENOUS EVERY 6 HOURS PRN
Status: DISCONTINUED | OUTPATIENT
Start: 2024-02-17 | End: 2024-02-19 | Stop reason: HOSPADM

## 2024-02-16 RX ORDER — DOCUSATE SODIUM 100 MG/1
100 CAPSULE, LIQUID FILLED ORAL 2 TIMES DAILY PRN
Status: DISCONTINUED | OUTPATIENT
Start: 2024-02-16 | End: 2024-02-19 | Stop reason: HOSPADM

## 2024-02-16 RX ORDER — IBUPROFEN 800 MG/1
800 TABLET ORAL EVERY 8 HOURS
Status: DISPENSED | OUTPATIENT
Start: 2024-02-16 | End: 2024-02-19

## 2024-02-16 RX ORDER — OXYCODONE HYDROCHLORIDE 5 MG/1
5 TABLET ORAL EVERY 4 HOURS PRN
Status: DISCONTINUED | OUTPATIENT
Start: 2024-02-17 | End: 2024-02-19 | Stop reason: HOSPADM

## 2024-02-16 RX ORDER — SODIUM CHLORIDE, SODIUM LACTATE, POTASSIUM CHLORIDE, CALCIUM CHLORIDE 600; 310; 30; 20 MG/100ML; MG/100ML; MG/100ML; MG/100ML
INJECTION, SOLUTION INTRAVENOUS PRN
Status: DISCONTINUED | OUTPATIENT
Start: 2024-02-16 | End: 2024-02-19 | Stop reason: HOSPADM

## 2024-02-16 RX ORDER — MISOPROSTOL 200 UG/1
800 TABLET ORAL
Status: DISCONTINUED | OUTPATIENT
Start: 2024-02-16 | End: 2024-02-16

## 2024-02-16 RX ORDER — DIPHENHYDRAMINE HYDROCHLORIDE 50 MG/ML
25 INJECTION INTRAMUSCULAR; INTRAVENOUS EVERY 6 HOURS PRN
Status: DISCONTINUED | OUTPATIENT
Start: 2024-02-17 | End: 2024-02-16

## 2024-02-16 RX ORDER — DIPHENHYDRAMINE HCL 25 MG
25 TABLET ORAL EVERY 6 HOURS PRN
Status: DISCONTINUED | OUTPATIENT
Start: 2024-02-16 | End: 2024-02-19 | Stop reason: HOSPADM

## 2024-02-16 RX ORDER — IBUPROFEN 800 MG/1
800 TABLET ORAL EVERY 8 HOURS PRN
Status: DISCONTINUED | OUTPATIENT
Start: 2024-02-20 | End: 2024-02-16

## 2024-02-16 RX ORDER — ACETAMINOPHEN 500 MG
1000 TABLET ORAL EVERY 6 HOURS
Qty: 24 TABLET | Refills: 0 | Status: DISCONTINUED | OUTPATIENT
Start: 2024-02-17 | End: 2024-02-19 | Stop reason: HOSPADM

## 2024-02-16 RX ORDER — HYDROMORPHONE HYDROCHLORIDE 1 MG/ML
0.2 INJECTION, SOLUTION INTRAMUSCULAR; INTRAVENOUS; SUBCUTANEOUS
Status: ACTIVE | OUTPATIENT
Start: 2024-02-16 | End: 2024-02-17

## 2024-02-16 RX ORDER — ONDANSETRON 2 MG/ML
4 INJECTION INTRAMUSCULAR; INTRAVENOUS EVERY 6 HOURS PRN
Status: DISPENSED | OUTPATIENT
Start: 2024-02-16 | End: 2024-02-17

## 2024-02-16 RX ORDER — EPHEDRINE SULFATE 50 MG/ML
10 INJECTION, SOLUTION INTRAVENOUS
Status: ACTIVE | OUTPATIENT
Start: 2024-02-16 | End: 2024-02-17

## 2024-02-16 RX ORDER — SIMETHICONE 125 MG
125 TABLET,CHEWABLE ORAL 4 TIMES DAILY PRN
Status: DISCONTINUED | OUTPATIENT
Start: 2024-02-16 | End: 2024-02-19 | Stop reason: HOSPADM

## 2024-02-16 RX ORDER — DIPHENHYDRAMINE HCL 25 MG
25 TABLET ORAL EVERY 6 HOURS PRN
Status: DISCONTINUED | OUTPATIENT
Start: 2024-02-17 | End: 2024-02-16

## 2024-02-16 RX ORDER — SODIUM CHLORIDE, SODIUM LACTATE, POTASSIUM CHLORIDE, CALCIUM CHLORIDE 600; 310; 30; 20 MG/100ML; MG/100ML; MG/100ML; MG/100ML
INJECTION, SOLUTION INTRAVENOUS ONCE
Status: COMPLETED | OUTPATIENT
Start: 2024-02-16 | End: 2024-02-16

## 2024-02-16 RX ORDER — OXYCODONE HYDROCHLORIDE 5 MG/1
5 TABLET ORAL EVERY 4 HOURS PRN
Status: DISPENSED | OUTPATIENT
Start: 2024-02-16 | End: 2024-02-17

## 2024-02-16 RX ORDER — SODIUM CHLORIDE, SODIUM GLUCONATE, SODIUM ACETATE, POTASSIUM CHLORIDE AND MAGNESIUM CHLORIDE 526; 502; 368; 37; 30 MG/100ML; MG/100ML; MG/100ML; MG/100ML; MG/100ML
INJECTION, SOLUTION INTRAVENOUS
Status: DISCONTINUED | OUTPATIENT
Start: 2024-02-16 | End: 2024-02-16 | Stop reason: SURG

## 2024-02-16 RX ORDER — ACETAMINOPHEN 500 MG
1000 TABLET ORAL EVERY 6 HOURS
Status: COMPLETED | OUTPATIENT
Start: 2024-02-16 | End: 2024-02-16

## 2024-02-16 RX ORDER — ACETAMINOPHEN 500 MG
1000 TABLET ORAL EVERY 6 HOURS PRN
Status: DISCONTINUED | OUTPATIENT
Start: 2024-02-20 | End: 2024-02-19 | Stop reason: HOSPADM

## 2024-02-16 RX ORDER — CALCIUM CARBONATE 500 MG/1
1000 TABLET, CHEWABLE ORAL EVERY 6 HOURS PRN
Status: DISCONTINUED | OUTPATIENT
Start: 2024-02-16 | End: 2024-02-19 | Stop reason: HOSPADM

## 2024-02-16 RX ORDER — IBUPROFEN 800 MG/1
800 TABLET ORAL EVERY 8 HOURS
Qty: 9 TABLET | Refills: 0 | Status: DISCONTINUED | OUTPATIENT
Start: 2024-02-17 | End: 2024-02-16

## 2024-02-16 RX ORDER — DIPHENHYDRAMINE HYDROCHLORIDE 50 MG/ML
25 INJECTION INTRAMUSCULAR; INTRAVENOUS EVERY 6 HOURS PRN
Status: DISCONTINUED | OUTPATIENT
Start: 2024-02-16 | End: 2024-02-16

## 2024-02-16 RX ORDER — CEFAZOLIN SODIUM 1 G/3ML
INJECTION, POWDER, FOR SOLUTION INTRAMUSCULAR; INTRAVENOUS PRN
Status: DISCONTINUED | OUTPATIENT
Start: 2024-02-16 | End: 2024-02-16 | Stop reason: SURG

## 2024-02-16 RX ORDER — KETOROLAC TROMETHAMINE 15 MG/ML
15 INJECTION, SOLUTION INTRAMUSCULAR; INTRAVENOUS EVERY 6 HOURS
Status: DISCONTINUED | OUTPATIENT
Start: 2024-02-16 | End: 2024-02-16

## 2024-02-16 RX ORDER — ONDANSETRON 2 MG/ML
4 INJECTION INTRAMUSCULAR; INTRAVENOUS
Status: DISCONTINUED | OUTPATIENT
Start: 2024-02-16 | End: 2024-02-16 | Stop reason: HOSPADM

## 2024-02-16 RX ORDER — DIPHENHYDRAMINE HYDROCHLORIDE 50 MG/ML
12.5 INJECTION INTRAMUSCULAR; INTRAVENOUS
Status: DISCONTINUED | OUTPATIENT
Start: 2024-02-16 | End: 2024-02-16 | Stop reason: HOSPADM

## 2024-02-16 RX ORDER — ONDANSETRON 4 MG/1
4 TABLET, ORALLY DISINTEGRATING ORAL EVERY 6 HOURS PRN
Status: DISCONTINUED | OUTPATIENT
Start: 2024-02-17 | End: 2024-02-19 | Stop reason: HOSPADM

## 2024-02-16 RX ORDER — BUPIVACAINE HYDROCHLORIDE 7.5 MG/ML
INJECTION, SOLUTION INTRASPINAL
Status: COMPLETED | OUTPATIENT
Start: 2024-02-16 | End: 2024-02-16

## 2024-02-16 RX ORDER — KETOROLAC TROMETHAMINE 15 MG/ML
15 INJECTION, SOLUTION INTRAMUSCULAR; INTRAVENOUS ONCE
Status: COMPLETED | OUTPATIENT
Start: 2024-02-16 | End: 2024-02-16

## 2024-02-16 RX ADMIN — BUPIVACAINE HYDROCHLORIDE IN DEXTROSE 1.8 ML: 7.5 INJECTION, SOLUTION SUBARACHNOID at 00:12

## 2024-02-16 RX ADMIN — IBUPROFEN 800 MG: 800 TABLET, FILM COATED ORAL at 10:47

## 2024-02-16 RX ADMIN — ONDANSETRON 4 MG: 2 INJECTION INTRAMUSCULAR; INTRAVENOUS at 03:18

## 2024-02-16 RX ADMIN — MORPHINE SULFATE 150 MCG: 0.5 INJECTION, SOLUTION EPIDURAL; INTRATHECAL; INTRAVENOUS at 00:12

## 2024-02-16 RX ADMIN — ACETAMINOPHEN 1000 MG: 500 TABLET ORAL at 03:18

## 2024-02-16 RX ADMIN — OXYTOCIN 1000 ML: 10 INJECTION, SOLUTION INTRAMUSCULAR; INTRAVENOUS at 00:39

## 2024-02-16 RX ADMIN — IBUPROFEN 800 MG: 800 TABLET, FILM COATED ORAL at 18:56

## 2024-02-16 RX ADMIN — ACETAMINOPHEN 1000 MG: 500 TABLET ORAL at 09:03

## 2024-02-16 RX ADMIN — SODIUM CHLORIDE, POTASSIUM CHLORIDE, SODIUM LACTATE AND CALCIUM CHLORIDE: 600; 310; 30; 20 INJECTION, SOLUTION INTRAVENOUS at 06:20

## 2024-02-16 RX ADMIN — DOCUSATE SODIUM 100 MG: 100 CAPSULE, LIQUID FILLED ORAL at 21:47

## 2024-02-16 RX ADMIN — OXYTOCIN 125 ML/HR: 10 INJECTION, SOLUTION INTRAMUSCULAR; INTRAVENOUS at 01:30

## 2024-02-16 RX ADMIN — DIPHENHYDRAMINE HYDROCHLORIDE 25 MG: 25 TABLET ORAL at 19:16

## 2024-02-16 RX ADMIN — OXYCODONE 5 MG: 5 TABLET ORAL at 19:28

## 2024-02-16 RX ADMIN — ACETAMINOPHEN 1000 MG: 500 TABLET ORAL at 15:07

## 2024-02-16 RX ADMIN — CEFAZOLIN 2 G: 1 INJECTION, POWDER, FOR SOLUTION INTRAMUSCULAR; INTRAVENOUS at 00:14

## 2024-02-16 RX ADMIN — DIPHENHYDRAMINE HYDROCHLORIDE 12.5 MG: 50 INJECTION, SOLUTION INTRAMUSCULAR; INTRAVENOUS at 07:01

## 2024-02-16 RX ADMIN — SIMETHICONE 125 MG: 125 TABLET, CHEWABLE ORAL at 09:03

## 2024-02-16 RX ADMIN — PHENYLEPHRINE HYDROCHLORIDE 100 MCG: 10 INJECTION INTRAVENOUS at 00:29

## 2024-02-16 RX ADMIN — KETOROLAC TROMETHAMINE 15 MG: 15 INJECTION, SOLUTION INTRAMUSCULAR; INTRAVENOUS at 02:06

## 2024-02-16 RX ADMIN — PRENATAL WITH FERROUS FUM AND FOLIC ACID 1 TABLET: 3080; 920; 120; 400; 22; 1.84; 3; 20; 10; 1; 12; 200; 27; 25; 2 TABLET ORAL at 08:47

## 2024-02-16 RX ADMIN — PHENYLEPHRINE HYDROCHLORIDE 100 MCG: 10 INJECTION INTRAVENOUS at 00:37

## 2024-02-16 RX ADMIN — SODIUM CHLORIDE, SODIUM GLUCONATE, SODIUM ACETATE, POTASSIUM CHLORIDE AND MAGNESIUM CHLORIDE: 526; 502; 368; 37; 30 INJECTION, SOLUTION INTRAVENOUS at 00:12

## 2024-02-16 RX ADMIN — DIPHENHYDRAMINE HYDROCHLORIDE 25 MG: 25 TABLET ORAL at 10:47

## 2024-02-16 RX ADMIN — MIDAZOLAM HYDROCHLORIDE 2 MG: 1 INJECTION, SOLUTION INTRAMUSCULAR; INTRAVENOUS at 00:26

## 2024-02-16 RX ADMIN — ACETAMINOPHEN 1000 MG: 500 TABLET ORAL at 21:47

## 2024-02-16 RX ADMIN — OXYCODONE HYDROCHLORIDE 10 MG: 10 TABLET ORAL at 12:10

## 2024-02-16 ASSESSMENT — PAIN DESCRIPTION - PAIN TYPE
TYPE: ACUTE PAIN;SURGICAL PAIN

## 2024-02-16 NOTE — ANESTHESIA PREPROCEDURE EVALUATION
Case: [id number] Date/Time: 02/15/24 1800    Procedure: REPEAT  SECTION WITH BILATERAL SALPINGECTOMY    Pre-op diagnosis:       PRIOR  SECTION, DESIRES PERMANENT STERILIZATION      39 4/7 WEEKS    Location: [facility] OR  SURGERY LABOR AND DELIVERY    Surgeons: Ray Valentin M.D.            Relevant Problems   No relevant active problems       Physical Exam    Airway   Mallampati: II  TM distance: >3 FB  Neck ROM: full       Cardiovascular - normal exam  Rhythm: regular  Rate: normal  (-) murmur     Dental - normal exam           Pulmonary - normal exam  Breath sounds clear to auscultation     Abdominal    Neurological - normal exam                   Anesthesia Plan    ASA 2       Plan - spinal   Neuraxial block will be primary anesthetic                      Informed Consent:    Anesthetic plan and risks discussed with patient.

## 2024-02-16 NOTE — OP REPORT
Operative Note    Patient: Velvet Faulkner Mota   Date: 2024  Surgeon: Ray Valentin MD  Assistant: Sergei Ibarra MD  Pre-op diagnosis: IUP at 39w5d, full-term pregnancy, decelerations in the office  Post-op diagnosis: same, delivered  Procedure: Repeat low-transverse  section via Pfannenstiel with double layer uterine closure with bilateral salpingectomy for sterilization  Anesthesia: Spinal  Anesthesiologist:  Adarsh Tapia M.D.  Pre-op Abx: Gentamicin and clindamycin  Findings:  1. Viable female infant in cephalic presentation.  2. Delivered at 12:38 AM  on 2024 .  3. APGARs 8 at 1 minute and 9 at 5 minutes  4. Birth 3.71 kg (8 lb 2.9 oz)   5. Normal appearing uterus, fallopian tubes and ovaries.   6. Clear amniotic fluid.  7. Normal appearing placenta, 3VC with central cord insertion.  EBL: 500mL  UOP: 200mL, clear, straw-colored  Fluids: 1500mL of crystaloid  Specimens/Cultures: umbilical cord gasses  Drains/Retained Objects: amado catheter  Complications: None  Condition: Good  Disposition: L&D PACU then Mother/Baby Unit  Narrative:    After having reviewed, in detail, the risks, benefits, and alternatives to  delivery, the patient signed the informed consent and wished to proceed with the procedure.  The patient was taken to the operating room with an IV in place.  She was administered spinal anesthesia without complication.  She was placed on the operating table in the dorsal supine position with a leftward tilt.  Under a sterile prep, the patient had a Amado catheter placed in her bladder and sequential compression devices placed on her legs for venous thrombotic prophylaxis.  She was prepped and draped in the usual sterile fashion for a  delivery.  The adequacy of the patient's analgesia was checked with an Allis test.  The patient was adequately anesthetized.     A Pfannenstiel skin incision was made across the lower abdomen sharply with the  scalpel.  This was carried down to the underlying layer of fascia with the Bovie.  The fascia was incised on either side of the midline.  This incision was then extended laterally with the Petersen scissors.  The inferior aspect of the fascial incision was grasped with Kocher clamps and elevated.  The underlying rectus muscles were dissected off the midline raphe, both with blunt dissection as well as sharply with the Petersen.  Attention was then turned to the superior aspect of the fascial incision, which, in a similar fashion, was grasped with Kocher clamps and elevated.  The rectus muscles were dissected off the midline raphe, both with blunt dissection and sharp dissection with the Petersen scissor.  The rectus muscles were spread laterally from the midline.  Small bleeding vessels were rendered hemostatic with the Bovie.  The peritoneum was identified and entered using blunt dissection.      This incision was extended superiorly and inferiorly with good visualization of the bladder using blunt dissection, sharp dissection and with the Bovie.  The Bubba-O self retaining retractor was placed, ensuring that no maternal tissues were trapped between the inner ring and the abdominal wall.  The uterine peritoneum was grasped with smooth pickups and incised sharply with the Metzenbaum scissors.  This incision was extended superolaterally with the Metzenbaum scissors.  A bladder flap was created digitally.      A low transverse hysterotomy was then made sharply with a scalpel to the level of the membranes.  This incision was extended superolaterally with blunt dissection.  The membranes were ruptured and the fluid character was noted as above.  The fetal head was then grasped and brought atraumatically through the incision.  This was followed by the remainder of the infant, also atraumatically.  The infant was delivered in whole atraumatically.  The nose and mouth were suctioned with the bulb suction.  After 30 seconds of delayed  cord clamping the cord was doubly clamped and cut.  The infant was then handed off to the awaiting pediatric resuscitation team including a respiratory therapist.      The placenta was then delivered spontaneously aided by manual expression from the uterus.  The uterus was cleared of all clot and debris with a lap sponge.  The hysterotomy was inspected and no extensions were noted.  The hysterotomy was then closed in a running locked fashion with 0 Vicryl.  A second layer of the same suture was then used to create an imbricating layer.  Small rene of continued bleeding were rendered hemostatic with figure-of-eight stitches of 0 Vicryl.  A survey of the hysterotomy revealed there was excellent hemostasis.   The pelvis was irrigated with warm, normal saline and suctioned clear.   Hemostasis was again confirmed. The uterus, tubes, and ovaries were then inspected with the above-findings.      The right fallopian tube was grasped at the distal end with a saurabh clamp.  The small handheld bipolar dissection device was used to coapt and transect the mesosalpinx and associated vasculature.  This was carried to the isthmic aspect of the tube which was also coapted and transected with the bipolar dissection device.  The same procedure was performed for the contralateral side.  The pedicles were inspected and found to be hemostatic.  The pelvis was irrigated with warm, normal saline and suctioned clear a final time and another survey of the hysterotomy revealed that was excellent hemostasis was again present.       The Bubba-O retractor was removed.  The peritoneum was closed with a running stitch of 3-0 Vicryl. The subfascial structures were inspected for bleeding and small bleeding areas were rendered hemostatic with the Bovie.  No other defects were identified.  The fascia itself was inspected for bleeding and buttonhole defects, and none were present.  As such, the fascia was then closed with 0 PDS in a running fashion.   The subcutaneous tissues were irrigated with warm, normal saline and dried with a new lap sponge.  They were then inspected for bleeding and small bleeding vessels were rendered hemostatic with the Bovie.  A subcutaneous layer of 2-0 Vicryl was then placed in a running fashion to minimize seroma formation.  The skin was then closed with 3-0 Monocryl in a running fashion.      The patient tolerated the procedure well.  Complications are as noted above.  Sponge, lap, needle, and instrument counts were reported to be correct.  The patient was taken to the recovery room in stable and awake condition with later plans to transfer to [facility].  Dr. Valentin was present throughout and performed the entire procedure.       Ray Valentin M.D.,  2/16/2024, 5:58 AM

## 2024-02-16 NOTE — ANESTHESIA TIME REPORT
Anesthesia Start and Stop Event Times       Date Time Event    2/16/2024 0005 Anesthesia Start     0117 Anesthesia Stop          Responsible Staff  02/16/24      Name Role Begin End    Adarsh Tapia M.D. Anesth 0005 0117          Overtime Reason:  no overtime (within assigned shift)    Comments:

## 2024-02-16 NOTE — PROGRESS NOTES
0530. Pt was having O2 desats while sleeping into the low to mid 80's. Pt placed on 1L of O2 with saturation levels climbing to the high 90%.

## 2024-02-16 NOTE — PROGRESS NOTES
0245 Infant received to Room # 341 from L&D in MOB's arms, placed into open crib, ID bands checked x2, cuddles tag in place and blinking. Bedside report received from Mary Jane L&D RN. Parents oriented to room, unit, POC, call light, feeding schedule, diapering, and infant safety and security; questions answered and parents verbalize understanding.

## 2024-02-16 NOTE — ANESTHESIA PROCEDURE NOTES
Spinal Block    Date/Time: 2/16/2024 12:12 AM    Performed by: Adarsh Tapia M.D.  Authorized by: Adarsh Tapia M.D.    Start Time:  2/16/2024 12:12 AM  Reason for Block: primary anesthetic    patient identified, IV checked, site marked, risks and benefits discussed, surgical consent, monitors and equipment checked, pre-op evaluation and timeout performed    Patient Position:  Sitting  Prep: ChloraPrep, patient draped and sterile technique    Monitoring:  Blood pressure, continuous pulse oximetry and heart rate  Approach:  Midline  Location:  L4-5  Injection Technique:  Single-shot  Skin infiltration:  Lidocaine  Strength:  1%  Dose:  3ml  Needle Type:  Pencan  Needle Gauge:  25 G  CSF flowing pre/post injection:  Yes  Sensory Level:  T4

## 2024-02-16 NOTE — PROGRESS NOTES
Patient has still not been able to void, even after trying smells, water water, drinking water, spraying self with water, and trying to relax. Will perform a straight cath if the bladder scanner shows residual greater than 200 ml.

## 2024-02-16 NOTE — PROGRESS NOTES
Called Dr. Garcia in regards to patient's IV infiltrating. Notified her that patinet is no longer nauseous or vomiting, and taking fluids and eating. Dr. Garcia ordered to not restart an IV, and may change the Totadol 15ml/ml to IM or to Ibuprofen 800 every 8 hours per patient's request. Patient wanted to change it to Ibuprofen 800 mg, per Dr. Garcia's order.

## 2024-02-16 NOTE — PROGRESS NOTES
1900- report received from DANITZA Fry.  , 39/4, admitted due to decels in office today, patient will be a repeat C/S. Pt was scheduled for C/S on Saturday but since she was having decels and min variability we will just do the C/S tonight.  - Pt currently shelton irregularly. Pt denies LOF or bleeding.  Pt confirms pos fetal movement.  - consents signed, Discussed POC. Will continue to monitor patient until we are can perform her C/S tonight.  0000- pre op complete.  0002- pt ambulated to OR2 in stable condition  0037- delivery of female infant 8/9 APGARS  0115- transferred pt to PACU in stable condition  0240- transferred pt to PP in stable condition.  - report given to DANITZA Sutton

## 2024-02-16 NOTE — DISCHARGE PLANNING
Discharge Planning Assessment Post Partum    Reason for Referral: History of depression   Address: [address] [place] [address] [address] [place] [id number]  Phone: [phone]  Type of Living Situation: stable housing   Mom Diagnosis: Pregnancy,    Baby Diagnosis: [place]-39.5 weeks   Primary Language: English     Father of the Baby: Jayme Ann   Involved in baby’s care? Yes  Contact Information: [phone]    Prenatal Care: Yes-Dr. 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  Provided PPD counseling and support resources.  Domestic Violence History: No  Drug/ETOH History: No    Resources Provided: post partum support and counseling resources and diaper bank assistance resources   Referrals Made: diaper bank referral provided      Clearance for Discharge: Infant is cleared to discharge home with parents once medically cleared

## 2024-02-16 NOTE — ANESTHESIA POSTPROCEDURE EVALUATION
Patient: Velvet Mota    Procedure Summary       Date: 24 Room / Location: [facility] OR 02 / SURGERY LABOR AND DELIVERY    Anesthesia Start: 0005 Anesthesia Stop: 011    Procedure: REPEAT  SECTION WITH BILATERAL SALPINGECTOMY (Abdomen) Diagnosis:       S/P  section      (S/P  Section)    Surgeons: Ray Valentin M.D. Responsible Provider: Adarsh Tapia M.D.    Anesthesia Type: spinal ASA Status: 2            Final Anesthesia Type: spinal  Last vitals  BP   Blood Pressure: 116/58    Temp   36.8 °C (98.3 °F)    Pulse   73   Resp   17    SpO2   95 %      Anesthesia Post Evaluation    Patient location during evaluation: PACU  Patient participation: complete - patient participated  Level of consciousness: awake and alert    Airway patency: patent  Anesthetic complications: no  Cardiovascular status: hemodynamically stable  Respiratory status: acceptable  Hydration status: euvolemic    PONV: none          There were no known notable events for this encounter.     Nurse Pain Score: 6 (NPRS)

## 2024-02-16 NOTE — CARE PLAN
Problem: Infection - Postpartum  Goal: Postpartum patient will be free of signs and symptoms of infection  Outcome: Progressing     Problem: Respiratory/Oxygenation Function Post-Surgical  Goal: Patient will achieve/maintain normal respiratory rate/effort  Outcome: Progressing   The patient is Stable - Low risk of patient condition declining or worsening    Shift Goals  Clinical Goals: VSS, firm fundus, light lochia  Patient Goals: pain management  Family Goals: rest    Progress made toward(s) clinical / shift goals:  pt vitals are stable and within defined parameters. Pt is not exhibiting evidence of infection or impaired respirations at this time.

## 2024-02-17 PROCEDURE — 700102 HCHG RX REV CODE 250 W/ 637 OVERRIDE(OP): Performed by: OBSTETRICS & GYNECOLOGY

## 2024-02-17 PROCEDURE — A9270 NON-COVERED ITEM OR SERVICE: HCPCS | Performed by: ANESTHESIOLOGY

## 2024-02-17 PROCEDURE — 700102 HCHG RX REV CODE 250 W/ 637 OVERRIDE(OP): Performed by: ANESTHESIOLOGY

## 2024-02-17 PROCEDURE — 770002 HCHG ROOM/CARE - OB PRIVATE (112)

## 2024-02-17 PROCEDURE — A9270 NON-COVERED ITEM OR SERVICE: HCPCS | Performed by: OBSTETRICS & GYNECOLOGY

## 2024-02-17 RX ADMIN — DIPHENHYDRAMINE HYDROCHLORIDE 25 MG: 25 TABLET ORAL at 01:06

## 2024-02-17 RX ADMIN — IBUPROFEN 800 MG: 800 TABLET, FILM COATED ORAL at 03:46

## 2024-02-17 RX ADMIN — SIMETHICONE 125 MG: 125 TABLET, CHEWABLE ORAL at 22:18

## 2024-02-17 RX ADMIN — OXYCODONE 10 MG: 5 TABLET ORAL at 13:27

## 2024-02-17 RX ADMIN — OXYCODONE 10 MG: 5 TABLET ORAL at 18:18

## 2024-02-17 RX ADMIN — SIMETHICONE 125 MG: 125 TABLET, CHEWABLE ORAL at 10:24

## 2024-02-17 RX ADMIN — PRENATAL WITH FERROUS FUM AND FOLIC ACID 1 TABLET: 3080; 920; 120; 400; 22; 1.84; 3; 20; 10; 1; 12; 200; 27; 25; 2 TABLET ORAL at 09:38

## 2024-02-17 RX ADMIN — DOCUSATE SODIUM 100 MG: 100 CAPSULE, LIQUID FILLED ORAL at 18:18

## 2024-02-17 RX ADMIN — IBUPROFEN 800 MG: 800 TABLET, FILM COATED ORAL at 09:38

## 2024-02-17 RX ADMIN — ACETAMINOPHEN 1000 MG: 500 TABLET ORAL at 03:47

## 2024-02-17 RX ADMIN — IBUPROFEN 800 MG: 800 TABLET, FILM COATED ORAL at 18:18

## 2024-02-17 RX ADMIN — OXYCODONE 5 MG: 5 TABLET ORAL at 01:06

## 2024-02-17 RX ADMIN — OXYCODONE 10 MG: 5 TABLET ORAL at 04:58

## 2024-02-17 RX ADMIN — ACETAMINOPHEN 1000 MG: 500 TABLET ORAL at 13:25

## 2024-02-17 RX ADMIN — SIMETHICONE 125 MG: 125 TABLET, CHEWABLE ORAL at 15:51

## 2024-02-17 RX ADMIN — OXYCODONE 10 MG: 5 TABLET ORAL at 22:18

## 2024-02-17 RX ADMIN — ACETAMINOPHEN 1000 MG: 500 TABLET ORAL at 18:17

## 2024-02-17 ASSESSMENT — PAIN DESCRIPTION - PAIN TYPE
TYPE: ACUTE PAIN;SURGICAL PAIN
TYPE: ACUTE PAIN;CHRONIC PAIN
TYPE: ACUTE PAIN;SURGICAL PAIN

## 2024-02-17 ASSESSMENT — EDINBURGH POSTNATAL DEPRESSION SCALE (EPDS)
I HAVE BEEN ABLE TO LAUGH AND SEE THE FUNNY SIDE OF THINGS: AS MUCH AS I ALWAYS COULD
I HAVE BEEN ANXIOUS OR WORRIED FOR NO GOOD REASON: HARDLY EVER
I HAVE BEEN SO UNHAPPY THAT I HAVE BEEN CRYING: NO, NEVER
THE THOUGHT OF HARMING MYSELF HAS OCCURRED TO ME: NEVER
I HAVE BLAMED MYSELF UNNECESSARILY WHEN THINGS WENT WRONG: YES, SOME OF THE TIME
I HAVE FELT SCARED OR PANICKY FOR NO GOOD REASON: YES, SOMETIMES
I HAVE BEEN SO UNHAPPY THAT I HAVE HAD DIFFICULTY SLEEPING: NOT VERY OFTEN
I HAVE FELT SAD OR MISERABLE: NO, NOT AT ALL
THINGS HAVE BEEN GETTING ON TOP OF ME: NO, MOST OF THE TIME I HAVE COPED QUITE WELL
I HAVE LOOKED FORWARD WITH ENJOYMENT TO THINGS: AS MUCH AS I EVER DID

## 2024-02-17 NOTE — CARE PLAN
Problem: Infection - Postpartum  Goal: Postpartum patient will be free of signs and symptoms of infection  Outcome: Progressing     Problem: Early Mobilization - Post Surgery  Goal: Early mobilization post surgery  Outcome: Progressing   The patient is Stable - Low risk of patient condition declining or worsening    Shift Goals  Clinical Goals: VSS, firm fundus  Patient Goals: pain management  Family Goals: rest    Progress made toward(s) clinical / shift goals:  Pt vitals are stable and within defined parameters. Pt is not exhibiting signs related to infection. Pt is up ad charo and ambulating self.

## 2024-02-17 NOTE — PROGRESS NOTES
POD#1  S) pt having trouble with pain, otherwise + ambulation, urination, tolerating regular diet  O) vss  Inc: c/d/i, mepilex covering  Ext: no edema  Hgb: 8.9 (9.1)  A/P POD#1, s/p repeat ltcs and ezequiel salpingectomy   - stable, continue all orders   - approve next oxy dose 20 min early

## 2024-02-17 NOTE — LACTATION NOTE
This note was copied from a baby's chart.  Follow-up Lactation Visit:    Spoke to Velvet, who was breastfeeding baby [place] at time of visit. Left cross-cradle. L10. Velvet denies nipple/breast discomfort. Reports milk is not yet in, but bedside RN reports copious colostrum on hand expression. Discussed normal time frame for transition from 1st to 2nd stage of lactation.    Reviewed signs of adequate milk intake, including adequate voids/stools, transition of stool to yellow/seedy around 5DOL, audible swallowing at breast, milk in mouth on release, progressive relaxation/satiation at breast during feeds, appropriate wt gain.    Ophelia with only a 2.6% loss at last wt, voiding/stooling appropriate to DOL. Good color and tone. Quiet alert and feeding vigorously.     [facility] and [facility] packet provided and reviewed yesterday.      Plan: Continue to offer infant the breast per feeding cues for a minimum of 8 or more feeds in a 24 hour period.  Frequent skin to skin as MOB awake and attentive.

## 2024-02-17 NOTE — PROGRESS NOTES
1900 Obtained report from DANITZA Sin Pt assessment completed. No abnormal findings noted. All pt needs and questions addressed at this time.

## 2024-02-17 NOTE — LACTATION NOTE
This note was copied from a baby's chart.  Initial Consult:     History:  MOB, Velvet, is 24 y/o, , s/p RC/S at 39+5 wks on 2024 at 0638. RC/S for variables in clinic. Depression.     Baby girl, Ophelia, was BW 3710 g (8 lbs, 2.9 oz).       History of BF: Velvet  her 1st baby (now a toddler) for 6 mo. She reports difficulties with latch and supply, but feels Ophelia is latching more easily.      Report of Current Breastfeeding Status:     Velvet had just finished breastfeeding at time of visit. Nipple round and intact, everted and pliable. Breasts round/soft/symmetrical.     Velvet is concerned breasts don't feel as full as they did with her last baby.     Baby Ophelia is relaxed and asleep in Velvet's arms, dressed in a t-shirt.     Breastfeeding Assistance:      Provided breastfeeding education on: supply and demand, 1st and 2nd stage of lactation, hunger cues, frequency/duration of breastfeeds, skin to skin, cluster feeding, shallow vs deep latch, and nutritive vs non-nutritive suck.    Encouraged feeding to cues, not to exceed 3 h between feeding attempts.    Reviewed signs of adequate milk intake, including adequate voids/stools, transition of stool to yellow/seedy around 5DOL, audible swallowing at breast, milk in mouth on release, progressive relaxation/satiation at breast during feeds, appropriate wt gain. Encouraged to keep peds appointments and see outpatient LC or attend breastfeeding group if she has any concerns over supply after discharge.    [facility] Breastfeeding Resources handout provided and outpatient lactation care and support [place] options reviewed.     Velvet has [facility] insurance. Packet provided and discussed how to get free pump through [facility] [facility].      Plan: Continue to offer infant the breast per feeding cues for a minimum of 8 or more feeds in a 24 hour period.  Frequent skin to skin as MOB awake and attentive.     Watch latch video on [facility] jocelin.

## 2024-02-17 NOTE — CARE PLAN
The patient is Stable - Low risk of patient condition declining or worsening    Shift Goals  Clinical Goals: VSS, firm fundus, light lochia  Patient Goals: pain management  Family Goals: rest    Progress made toward(s) clinical / shift goals:  Patient needs to void.    Patient is not progressing towards the following goals:      Problem: Altered Physiologic Condition  Goal: Patient physiologically stable as evidenced by normal lochia, palpable uterine involution and vitals within normal limits  Outcome: Not Progressing  Note: Not able to void.

## 2024-02-18 PROCEDURE — 770002 HCHG ROOM/CARE - OB PRIVATE (112)

## 2024-02-18 PROCEDURE — 700102 HCHG RX REV CODE 250 W/ 637 OVERRIDE(OP): Performed by: OBSTETRICS & GYNECOLOGY

## 2024-02-18 PROCEDURE — A9270 NON-COVERED ITEM OR SERVICE: HCPCS | Performed by: OBSTETRICS & GYNECOLOGY

## 2024-02-18 RX ORDER — POLYETHYLENE GLYCOL 3350 17 G/17G
1 POWDER, FOR SOLUTION ORAL DAILY
Status: DISCONTINUED | OUTPATIENT
Start: 2024-02-18 | End: 2024-02-19 | Stop reason: HOSPADM

## 2024-02-18 RX ORDER — BISACODYL 10 MG
10 SUPPOSITORY, RECTAL RECTAL PRN
Status: COMPLETED | OUTPATIENT
Start: 2024-02-18 | End: 2024-02-18

## 2024-02-18 RX ADMIN — DOCUSATE SODIUM 100 MG: 100 CAPSULE, LIQUID FILLED ORAL at 16:28

## 2024-02-18 RX ADMIN — IBUPROFEN 800 MG: 800 TABLET, FILM COATED ORAL at 14:03

## 2024-02-18 RX ADMIN — ACETAMINOPHEN 1000 MG: 500 TABLET ORAL at 06:21

## 2024-02-18 RX ADMIN — POLYETHYLENE GLYCOL 3350 1 PACKET: 17 POWDER, FOR SOLUTION ORAL at 09:48

## 2024-02-18 RX ADMIN — OXYCODONE 10 MG: 5 TABLET ORAL at 02:42

## 2024-02-18 RX ADMIN — OXYCODONE 10 MG: 5 TABLET ORAL at 06:21

## 2024-02-18 RX ADMIN — ACETAMINOPHEN 1000 MG: 500 TABLET ORAL at 21:25

## 2024-02-18 RX ADMIN — IBUPROFEN 800 MG: 800 TABLET, FILM COATED ORAL at 02:42

## 2024-02-18 RX ADMIN — DOCUSATE SODIUM 100 MG: 100 CAPSULE, LIQUID FILLED ORAL at 06:21

## 2024-02-18 RX ADMIN — PRENATAL WITH FERROUS FUM AND FOLIC ACID 1 TABLET: 3080; 920; 120; 400; 22; 1.84; 3; 20; 10; 1; 12; 200; 27; 25; 2 TABLET ORAL at 09:47

## 2024-02-18 RX ADMIN — Medication 10 MG: at 16:28

## 2024-02-18 RX ADMIN — ACETAMINOPHEN 1000 MG: 500 TABLET ORAL at 00:17

## 2024-02-18 RX ADMIN — IBUPROFEN 800 MG: 800 TABLET, FILM COATED ORAL at 21:25

## 2024-02-18 RX ADMIN — SIMETHICONE 125 MG: 125 TABLET, CHEWABLE ORAL at 16:28

## 2024-02-18 RX ADMIN — ACETAMINOPHEN 1000 MG: 500 TABLET ORAL at 14:03

## 2024-02-18 RX ADMIN — SIMETHICONE 125 MG: 125 TABLET, CHEWABLE ORAL at 04:12

## 2024-02-18 RX ADMIN — MAGNESIUM HYDROXIDE 30 ML: 1200 LIQUID ORAL at 14:14

## 2024-02-18 ASSESSMENT — PAIN DESCRIPTION - PAIN TYPE
TYPE: ACUTE PAIN;SURGICAL PAIN
TYPE: SURGICAL PAIN
TYPE: SURGICAL PAIN
TYPE: ACUTE PAIN;SURGICAL PAIN

## 2024-02-18 NOTE — PROGRESS NOTES
Progress Note    Velvet Faulkner Nakul   Post-op day 2  Chief Admitting Dx: Indication for care in labor or delivery [O75.9]  Delivery Type:  for repeat with ezequiel salpingectomy      Subjective:  Ambulating: voiding, tolerating diet, normal lochia, pain IS NOt UNDER control at this time. She feels she needs to have a bowel movement and feels gas pains. She has passed flatus.    Vitals:    24 2200 24 0600 24 1745 24 0526   BP: 103/68 113/75 120/75 123/75   Pulse: 77 81 74 74   Resp:    Temp: 36.3 °C (97.4 °F) 36.5 °C (97.7 °F) 36 °C (96.8 °F) 36.2 °C (97.1 °F)   TempSrc: Temporal Temporal Temporal Temporal   SpO2: 97% 95% 94% 95%   Weight:       Height:           Exam:  Gen: No acute distress  Abdomen: soft nt/nd firm fundus  Inc: c/d/i with postop dressing  Ext: no calf pain ezequiel LE, 1+ ezequiel LE edema    Labs:   No results found for this or any previous visit (from the past 24 hour(s)).    Assessment:  Pod 2 s/p repeat c/s with bilateral salpingectomy    Plan:  Routine postop care  Will try to get pain under better control; If improves by this pm she may be able to go home today.   Dr Jones on call and will see how she feels this afternoon    Zulema Green M.D.

## 2024-02-18 NOTE — CARE PLAN
The patient is Stable - Low risk of patient condition declining or worsening    Shift Goals  Clinical Goals: maintain normal lochia  Patient Goals: pain controlled, rest  Family Goals: rest    Progress made toward(s) clinical / shift goals:    Problem: Psychosocial - Postpartum  Goal: Patient will verbalize and demonstrate effective bonding and parenting behavior  Outcome: Progressing  Note: Encouraged to voice feelings; providing frequent education     Problem: Altered Physiologic Condition  Goal: Patient physiologically stable as evidenced by normal lochia, palpable uterine involution and vitals within normal limits  Outcome: Progressing  Note: Continued monitoring and assessment with education        Patient is not progressing towards the following goals:

## 2024-02-18 NOTE — PROGRESS NOTES
Received report from DANITZA Sutton at change of shift. Patient assessed and POC discussed. Patient is resting in bed. Fundus firm, lochia light.  Patient denies any needs at this time. Call light within reach, bed in lowest position. Patient is encouraged to call for pain/med interventions and any other needs.

## 2024-02-18 NOTE — CARE PLAN
Problem: Infection - Postpartum  Goal: Postpartum patient will be free of signs and symptoms of infection  Outcome: Progressing     Problem: Respiratory/Oxygenation Function Post-Surgical  Goal: Patient will achieve/maintain normal respiratory rate/effort  Outcome: Progressing   The patient is Stable - Low risk of patient condition declining or worsening    Shift Goals  Clinical Goals: pain management  Patient Goals: pain management  Family Goals: rest    Progress made toward(s) clinical / shift goals:  Pt vitals WDL. Pt is not displaying signs or symptoms related to infection or impaired respiratory function at this time.

## 2024-02-18 NOTE — PROGRESS NOTES
[id number] Obtained report from DANITZA Steinberg  1940 Pt assessment completed. No abnormal findings noted. All pt needs and questions addressed at this time.

## 2024-02-18 NOTE — CARE PLAN
The patient is Stable - Low risk of patient condition declining or worsening    Shift Goals  Clinical Goals: clincally stable  Patient Goals: pain management  Family Goals: rest    Progress made toward(s) clinical / shift goals:  patient is starting to ambulate the hallways. She has a distended abdomen and gas pains.     Patient is not progressing towards the following goals:      Problem: Bowel Elimination - Post Surgical  Goal: Patient will resume regular bowel sounds and function with no discomfort or distention  Outcome: Not Progressing  Note: Patient feels like she needs to make a bowel movement. She has been having a lot of gas pains, and her abdomen is distended.

## 2024-02-18 NOTE — LACTATION NOTE
F/U visit  MOB is P2. She reports she was unable to latch her first baby, now 18 mo old, and she pumped for 6 months. Her desire is to exclusively breastfeed.  I reviewed hunger cues, feeding on cue without time limits, offering both breasts every feeding, cluster feeding, normal  feeding frequency of first 6-8 weeks, Post d/c resources.  MOB given [employer] jocelin.  Nipples intact bilaterally and breasts are soft. Discussed engorgement and how to manage it if it occurs. She reports baby is waking for feedings.  Baby shows hunger cues and MOB offers breast in football hold, good positioning. Baby latches and 1:1 suck:swallows audible. Denies pain with suckling. Discussed  stool changes expected by day 5. Encouraged attendance at bfdg group for ongoing support and weight checks.   F/U on @ 1030:  ABHI reports her breasts feel heavier today and baby has milk at corners of mouth after feedings. She reports nipples intact but tender, discussed using EBM and lanolin on nipples as needed. Discussed  deep latch to prevent damage to nipples. Reviewed Birth [employer] jocelin info and encouraged to watch latching videos. Reviewed post d/c resources for bfdg concerns and encouraged attendance at bfdg groups for ongoing support.

## 2024-02-19 ENCOUNTER — PHARMACY VISIT (OUTPATIENT)
Dept: PHARMACY | Facility: MEDICAL CENTER | Age: 24
End: 2024-02-19
Payer: COMMERCIAL

## 2024-02-19 VITALS
OXYGEN SATURATION: 96 % | HEART RATE: 82 BPM | TEMPERATURE: 97.1 F | RESPIRATION RATE: 18 BRPM | BODY MASS INDEX: 41.22 KG/M2 | SYSTOLIC BLOOD PRESSURE: 128 MMHG | WEIGHT: 224 LBS | DIASTOLIC BLOOD PRESSURE: 98 MMHG | HEIGHT: 62 IN

## 2024-02-19 PROCEDURE — A9270 NON-COVERED ITEM OR SERVICE: HCPCS | Performed by: OBSTETRICS & GYNECOLOGY

## 2024-02-19 PROCEDURE — 700102 HCHG RX REV CODE 250 W/ 637 OVERRIDE(OP): Performed by: OBSTETRICS & GYNECOLOGY

## 2024-02-19 PROCEDURE — RXMED WILLOW AMBULATORY MEDICATION CHARGE: Performed by: OBSTETRICS & GYNECOLOGY

## 2024-02-19 RX ORDER — OXYCODONE HYDROCHLORIDE 5 MG/1
5 TABLET ORAL EVERY 4 HOURS PRN
Qty: 15 TABLET | Refills: 0 | Status: SHIPPED | OUTPATIENT
Start: 2024-02-19 | End: 2024-02-24

## 2024-02-19 RX ORDER — IBUPROFEN 800 MG/1
TABLET ORAL
Qty: 30 TABLET | Refills: 1 | Status: SHIPPED | OUTPATIENT
Start: 2024-02-19 | End: 2024-03-18

## 2024-02-19 RX ADMIN — ACETAMINOPHEN 1000 MG: 500 TABLET ORAL at 05:58

## 2024-02-19 RX ADMIN — IBUPROFEN 800 MG: 800 TABLET, FILM COATED ORAL at 05:56

## 2024-02-19 RX ADMIN — OXYCODONE 5 MG: 5 TABLET ORAL at 10:07

## 2024-02-19 RX ADMIN — DOCUSATE SODIUM 100 MG: 100 CAPSULE, LIQUID FILLED ORAL at 05:56

## 2024-02-19 RX ADMIN — ACETAMINOPHEN 1000 MG: 500 TABLET ORAL at 12:58

## 2024-02-19 RX ADMIN — OXYCODONE 5 MG: 5 TABLET ORAL at 05:59

## 2024-02-19 RX ADMIN — PRENATAL WITH FERROUS FUM AND FOLIC ACID 1 TABLET: 3080; 920; 120; 400; 22; 1.84; 3; 20; 10; 1; 12; 200; 27; 25; 2 TABLET ORAL at 10:06

## 2024-02-19 RX ADMIN — POLYETHYLENE GLYCOL 3350 1 PACKET: 17 POWDER, FOR SOLUTION ORAL at 05:56

## 2024-02-19 ASSESSMENT — PAIN DESCRIPTION - PAIN TYPE
TYPE: ACUTE PAIN
TYPE: ACUTE PAIN
TYPE: ACUTE PAIN;SURGICAL PAIN
TYPE: ACUTE PAIN;SURGICAL PAIN

## 2024-02-19 NOTE — CARE PLAN
Problem: Pain - Standard  Goal: Alleviation of pain or a reduction in pain to the patient’s comfort goal  Outcome: Progressing     Problem: Altered Physiologic Condition  Goal: Patient physiologically stable as evidenced by normal lochia, palpable uterine involution and vitals within normal limits  Outcome: Progressing     Problem: Infection - Postpartum  Goal: Postpartum patient will be free of signs and symptoms of infection  Outcome: Progressing     The patient is Watcher - Medium risk of patient condition declining or worsening    Shift Goals  Clinical Goals: Pain control, lochia WDL  Patient Goals: pain controlled, rest  Family Goals: rest    Progress made toward(s) clinical / shift goals:  Pain well controlled with non-narcotic analgesia, pt is able to ambulate and care for self and infant without difficulty. Lochia remains light without clots expressed, performing lorna care and pad changes independently. No s/s infection noted on assessment, remains afebrile.    Patient is not progressing towards the following goals: NA

## 2024-02-19 NOTE — DISCHARGE INSTRUCTIONS

## 2024-02-19 NOTE — PROGRESS NOTES
Assessment done. Pt.complains of pain at 4 and patient medicated. Incision covered with mepilex dressing without drainage, swelling or redness.Lochia scant to light. Pt ambulating in room without dizziness or assistance.Pt. Passing flatus, no problems urinating. Fob in room ,supportive.plan of care for today discussed.

## 2024-02-19 NOTE — PROGRESS NOTES
Discharge instructions reviewed by patient.  screen, patient and baby f/u appts also reviewed by patient . Car seat present. Birth certificate done. patient received meds to bed. Bands matched and security tag removed.Mom and baby ready for discharge home.

## 2024-02-19 NOTE — PROGRESS NOTES
Bedside report received from dayshift RN. 12hr chart check complete, MAR and orders reviewed. Pt resting in bed, denies pain or needs at this time, call light within reach.

## 2024-02-19 NOTE — DISCHARGE SUMMARY
DATE OF ADMISSION:  02/15/2024   DATE OF DISCHARGE:  2024     ADMITTING DIAGNOSES:  1.  Pregnancy at 39 and 4/7th weeks.  2.  Abnormal antepartum testing.  3.  Previous , desires repeat.  4.  Undesired fertility, multiparity, desires permanent sterilization.     DISCHARGE DIAGNOSES:  1.  Pregnancy at 39 and 4/7th weeks.  2.  Abnormal antepartum testing.  3.  Previous , desires repeat.  4.  Undesired fertility, multiparity, desires permanent sterilization.  5.  Status post repeat low transverse , bilateral salpingectomy.     HOSPITAL COURSE IN DETAIL:  This patient was admitted on the aforementioned   date with the aforementioned diagnoses.  Repeat low transverse  was   performed.  Findings included female infant with Apgars of 8 and 9.  The   patient and baby in recovery in stable condition.  On postoperative day #1,   she is doing well without complaint, tolerating clears.  By postoperative day   #2, she is tolerating regular diet.  Today, postoperative day #3, she desires   discharge home.  She is afebrile.  Vital signs are within normal limits.  Her   abdomen is soft with full fundus below the umbilicus.  Wound is clean, dry and   intact.  No erythema, induration or discharge.  H and H is stable at 8.9 and   28.7.     ASSESSMENT:  At this time is postop day #3 status post repeat low transverse   , bilateral salpingectomy, doing well, desires discharge home.     PLAN:  At this time:  1.  Discharge home.  2.  Follow up in 2 weeks.  3.  Pelvic rest.  4.  Lift precautions.  5.  Scripts for Motrin and oxy consented and written.        ______________________________  MD DONALD Crow/GIOVANNA    DD:  2024 05:58  DT:  2024 06:13    Job#:  [id number]

## 2024-02-19 NOTE — PROGRESS NOTES
Hospital Day : 4    S: doing well; desired dc; min lochia    O:  Vitals:    02/17/24 0600 02/17/24 1745 02/18/24 0526 02/18/24 1800   BP: 113/75 120/75 123/75 131/80   Pulse: 81 74 74 78   Resp: 18 17 19 18   Temp: 36.5 °C (97.7 °F) 36 °C (96.8 °F) 36.2 °C (97.1 °F) 36.5 °C (97.7 °F)   TempSrc: Temporal Temporal Temporal Temporal   SpO2: 95% 94% 95% 97%   Weight:       Height:           Recent Labs     02/16/24  1300   WBC 9.3   RBC 3.52*   HEMOGLOBIN 8.9*   HEMATOCRIT 28.7*   MCV 81.5   MCH 25.3*   MCHC 31.0*   RDW 40.0   PLATELETCT 310   MPV 11.6             Abd soft ff; cdi    A: pod 3 sp rltcs and bs; doing well    P: dc

## 2024-08-13 ENCOUNTER — PATIENT MESSAGE (OUTPATIENT)
Dept: HEALTH INFORMATION MANAGEMENT | Facility: OTHER | Age: 24
End: 2024-08-13

## 2024-09-04 ENCOUNTER — DOCUMENTATION (OUTPATIENT)
Dept: HEALTH INFORMATION MANAGEMENT | Facility: OTHER | Age: 24
End: 2024-09-04
Payer: COMMERCIAL

## 2024-12-05 ENCOUNTER — OFFICE VISIT (OUTPATIENT)
Dept: URGENT CARE | Facility: PHYSICIAN GROUP | Age: 24
End: 2024-12-05
Payer: COMMERCIAL

## 2024-12-05 VITALS
RESPIRATION RATE: 20 BRPM | BODY MASS INDEX: 36.5 KG/M2 | HEART RATE: 90 BPM | OXYGEN SATURATION: 97 % | HEIGHT: 63 IN | TEMPERATURE: 97.3 F | SYSTOLIC BLOOD PRESSURE: 126 MMHG | WEIGHT: 206 LBS | DIASTOLIC BLOOD PRESSURE: 87 MMHG

## 2024-12-05 DIAGNOSIS — J00 ACUTE RHINITIS: Primary | ICD-10-CM

## 2024-12-05 DIAGNOSIS — R09.81 NASAL CONGESTION: ICD-10-CM

## 2024-12-05 LAB
FLUAV RNA SPEC QL NAA+PROBE: NEGATIVE
FLUBV RNA SPEC QL NAA+PROBE: NEGATIVE
RSV RNA SPEC QL NAA+PROBE: NEGATIVE
SARS-COV-2 RNA RESP QL NAA+PROBE: NEGATIVE

## 2024-12-05 PROCEDURE — 99213 OFFICE O/P EST LOW 20 MIN: CPT

## 2024-12-05 PROCEDURE — 3079F DIAST BP 80-89 MM HG: CPT

## 2024-12-05 PROCEDURE — 0241U POCT CEPHEID COV-2, FLU A/B, RSV - PCR: CPT

## 2024-12-05 PROCEDURE — 3074F SYST BP LT 130 MM HG: CPT

## 2024-12-05 RX ORDER — CETIRIZINE HYDROCHLORIDE 10 MG/1
10 TABLET ORAL DAILY
Qty: 30 TABLET | Refills: 0 | Status: SHIPPED | OUTPATIENT
Start: 2024-12-05

## 2024-12-05 RX ORDER — FLUTICASONE PROPIONATE 50 MCG
1 SPRAY, SUSPENSION (ML) NASAL DAILY
Qty: 16 G | Refills: 0 | Status: SHIPPED | OUTPATIENT
Start: 2024-12-05

## 2024-12-05 NOTE — PROGRESS NOTES
Subjective:   Velvet Mota is a 24 y.o. female who presents for nasal congestion/runny nose         I introduced myself to the patient and informed them that I am a Family Nurse Practitioner.    HPI:Velvet is a 24 year-old female who comes in today c/o  nasal congestion, runny nose, malaise.She denies any other viral URI type sx.  Onset was 2 days ago. Patient describes symptoms as constant. They describe the pain as mild headache. Aggravating factors include none. Relieving factors include none. Treatments tried at home include  none.  They describe their symptoms as moderate. Denies any known exposure to Covid, Flu, RSV, strep. Denies anyone else is sick at home presently. States they did not get a flu shot this season, states vaccinated against Covid x none. She states she  is currently breastfeeding her infant.       Review of Systems   Constitutional:  Negative for chills, fever and malaise/fatigue.   HENT:  Positive for congestion. Negative for ear pain and sore throat.    Eyes:  Negative for pain, discharge and redness.   Respiratory:  Negative for cough, sputum production, shortness of breath, wheezing and stridor.    Cardiovascular:  Negative for chest pain and palpitations.   Gastrointestinal:  Negative for abdominal pain, diarrhea, nausea and vomiting.   Genitourinary:  Negative for dysuria.   Musculoskeletal:  Negative for myalgias.   Skin:  Negative for rash.   Neurological:  Negative for dizziness and headaches.       Medications: omeprazole  PRENATAL VITAMIN PO     Allergies: Rocephin [ceftriaxone]    Problem List: does not have any pertinent problems on file.    Surgical History:  Past Surgical History:   Procedure Laterality Date    REPEAT C SECTOIN WITH SALPINGECTOMY N/A 2024    Procedure: REPEAT  SECTION WITH BILATERAL SALPINGECTOMY;  Surgeon: Ray Valentin M.D.;  Location[facility];  Service: Labor and Delivery    PRIMARY C SECTION Bilateral  "2022    Procedure:  SECTION, PRIMARY;  Surgeon: Elsa Lane M.D.;  Location: [facility];  Service: Labor and Delivery       Past Social Hx:   reports that she has never smoked. She has never been exposed to tobacco smoke. She has never used smokeless tobacco. She reports that she does not currently use alcohol. She reports that she does not currently use drugs after having used the following drugs: Inhaled.     Past Family Hx:   family history includes Multiple myeloma in her mother; Pancreatic Cancer in her father; Scoliosis in her brother.     Problem list, medications, and allergies reviewed by myself today in Epic.   I have documented what I find to be significant in regards to past medical, social, family and surgical history  in my HPI or under PMH/PSH/FH review section, otherwise it is noncontributory     Objective:     /87   Pulse 90   Temp 36.3 °C (97.3 °F) (Temporal)   Resp 20   Ht 1.6 m (5' 3\")   Wt 93.4 kg (206 lb)   SpO2 97%   BMI 36.49 kg/m²     During this visit, appropriate PPE was worn, and hand hygiene was performed.    Physical Exam  Vitals reviewed.   Constitutional:       General: She is not in acute distress.     Appearance: Normal appearance. She is not ill-appearing or toxic-appearing.   HENT:      Head: Normocephalic and atraumatic.      Right Ear: Tympanic membrane, ear canal and external ear normal. There is no impacted cerumen.      Left Ear: Tympanic membrane, ear canal and external ear normal. There is no impacted cerumen.      Nose: Congestion and rhinorrhea present. Rhinorrhea is clear.      Right Turbinates: Enlarged and pale.      Left Turbinates: Enlarged and pale.      Right Sinus: No maxillary sinus tenderness or frontal sinus tenderness.      Left Sinus: No maxillary sinus tenderness or frontal sinus tenderness.      Mouth/Throat:      Pharynx: Oropharynx is clear. No oropharyngeal exudate or posterior oropharyngeal erythema.   Eyes: "      General: No scleral icterus.        Right eye: No discharge.         Left eye: No discharge.      Conjunctiva/sclera: Conjunctivae normal.      Pupils: Pupils are equal, round, and reactive to light.   Cardiovascular:      Rate and Rhythm: Normal rate and regular rhythm.      Heart sounds: Normal heart sounds. No murmur heard.     No friction rub. No gallop.   Pulmonary:      Effort: Pulmonary effort is normal. No respiratory distress.      Breath sounds: Normal breath sounds. No wheezing, rhonchi or rales.   Abdominal:      General: There is no distension.   Musculoskeletal:         General: Normal range of motion.      Cervical back: Normal range of motion. No rigidity.      Right lower leg: No edema.      Left lower leg: No edema.   Lymphadenopathy:      Cervical: No cervical adenopathy.   Skin:     General: Skin is warm and dry.      Coloration: Skin is not jaundiced.   Neurological:      General: No focal deficit present.      Mental Status: She is alert and oriented to person, place, and time. Mental status is at baseline.   Psychiatric:         Mood and Affect: Mood normal.         Behavior: Behavior normal.         Thought Content: Thought content normal.         Judgment: Judgment normal.             Lab Results/POC Test Results   Results for orders placed or performed in visit on 12/05/24   POCT CoV-2, Flu A/B, RSV by PCR    Collection Time: 12/05/24  5:02 PM   Result Value Ref Range    SARS-CoV-2 by PCR Negative Negative, Invalid    Influenza virus A RNA Negative Negative, Invalid    Influenza virus B, PCR Negative Negative, Invalid    RSV, PCR Negative Negative, Invalid           Use of a second-generation antihistamine is preferred when an oral antihistamine is needed in lactating patients (Vaughn 2014, EAACI [Vachelle 2022]).   Use of intranasal fl?ti??s??e is considered compatible with breastfeeding (ERS/TSANZ [Palmer 2020]).     Assessment/Plan:     Diagnosis and associated orders:     1.  Acute rhinitis  fluticasone (FLONASE) 50 MCG/ACT nasal spray    cetirizine (ZYRTEC ALLERGY) 10 MG Tab    POCT CoV-2, Flu A/B, RSV by PCR      2. Nasal congestion  fluticasone (FLONASE) 50 MCG/ACT nasal spray    cetirizine (ZYRTEC ALLERGY) 10 MG Tab    POCT CoV-2, Flu A/B, RSV by PCR         Comments/MDM:     1. Nasal congestion  - fluticasone (FLONASE) 50 MCG/ACT nasal spray; Administer 1 Spray into affected nostril(S) every day.  Dispense: 16 g; Refill: 0  - cetirizine (ZYRTEC ALLERGY) 10 MG Tab; Take 1 Tablet by mouth every day.  Dispense: 30 Tablet; Refill: 0  - POCT CoV-2, Flu A/B, RSV by PCR    2. Acute rhinitis (Primary)  Patient's presentation and symptoms as well as physical exam are consistent with viral rhinosinusitis.   discussed with patient Dx,  DDx, management options (risks,benefits, and alternatives to planned treatment), natural progression.   Supportive care measures were discussed.   Questions were encouraged and answered.   Written information was provided and I did go over this with the patient in clinic today.  Instructed patient regarding red flags and to return to urgent care prn if new or worsening sx or if there is no improvement in condition prn.    Advised the patient to follow-up with the primary care physician for recheck, reevaluation, and consideration of further management.  I did instruct patient regarding medications prescribed, purpose, side effects, precautions.  Instructed patient to get a pharmacy consult when picking up any prescribed medications.  Strict ER precautions discussed for any worsening symptoms, fever, chills, nausea or vomiting, lethargy   Patient states they have good understanding and they are agreeable with the plan of care.     - fluticasone (FLONASE) 50 MCG/ACT nasal spray; Administer 1 Spray into affected nostril(S) every day.  Dispense: 16 g; Refill: 0  - cetirizine (ZYRTEC ALLERGY) 10 MG Tab; Take 1 Tablet by mouth every day.  Dispense: 30 Tablet; Refill:  0  - POCT CoV-2, Flu A/B, RSV by PCR         Pt is clinically stable at today's acute urgent care visit. Vital signs are normal and reassuring.  No acute distress noted. Appropriate for outpatient management at this time.        I personally reviewed prior external notes and test results pertinent to today's visit.  I have independently reviewed and interpreted all diagnostics ordered during this urgent care acute visit.        Please note that this dictation was created using voice recognition software. I have made a reasonable attempt to correct obvious errors, but I expect that there are errors of grammar and possibly content that I did not discover before finalizing the note.    This note was electronically signed by Aryan BRENNAN, FRAN, JIM, CAMILLE

## 2024-12-10 ASSESSMENT — ENCOUNTER SYMPTOMS
SPUTUM PRODUCTION: 0
PALPITATIONS: 0
DIZZINESS: 0
CHILLS: 0
DIARRHEA: 0
EYE REDNESS: 0
VOMITING: 0
MYALGIAS: 0
SORE THROAT: 0
SHORTNESS OF BREATH: 0
FEVER: 0
ABDOMINAL PAIN: 0
EYE DISCHARGE: 0
STRIDOR: 0
EYE PAIN: 0
NAUSEA: 0
COUGH: 0
WHEEZING: 0
HEADACHES: 0

## 2025-02-11 ENCOUNTER — OFFICE VISIT (OUTPATIENT)
Dept: URGENT CARE | Facility: PHYSICIAN GROUP | Age: 25
End: 2025-02-11
Payer: COMMERCIAL

## 2025-02-11 ENCOUNTER — APPOINTMENT (OUTPATIENT)
Dept: URGENT CARE | Facility: PHYSICIAN GROUP | Age: 25
End: 2025-02-11
Payer: COMMERCIAL

## 2025-02-11 VITALS
WEIGHT: 204.9 LBS | BODY MASS INDEX: 37.71 KG/M2 | OXYGEN SATURATION: 95 % | TEMPERATURE: 97.2 F | HEART RATE: 94 BPM | RESPIRATION RATE: 16 BRPM | DIASTOLIC BLOOD PRESSURE: 78 MMHG | SYSTOLIC BLOOD PRESSURE: 98 MMHG | HEIGHT: 62 IN

## 2025-02-11 DIAGNOSIS — J02.9 PHARYNGITIS, UNSPECIFIED ETIOLOGY: ICD-10-CM

## 2025-02-11 DIAGNOSIS — J06.9 VIRAL URI WITH COUGH: ICD-10-CM

## 2025-02-11 LAB
FLUAV RNA SPEC QL NAA+PROBE: NEGATIVE
FLUBV RNA SPEC QL NAA+PROBE: NEGATIVE
RSV RNA SPEC QL NAA+PROBE: NEGATIVE
S PYO DNA SPEC NAA+PROBE: NOT DETECTED
SARS-COV-2 RNA RESP QL NAA+PROBE: NEGATIVE

## 2025-02-11 PROCEDURE — 0241U POCT CEPHEID COV-2, FLU A/B, RSV - PCR: CPT

## 2025-02-11 PROCEDURE — 99214 OFFICE O/P EST MOD 30 MIN: CPT

## 2025-02-11 PROCEDURE — 3074F SYST BP LT 130 MM HG: CPT

## 2025-02-11 PROCEDURE — 87651 STREP A DNA AMP PROBE: CPT

## 2025-02-11 PROCEDURE — 3078F DIAST BP <80 MM HG: CPT

## 2025-02-11 ASSESSMENT — ENCOUNTER SYMPTOMS
CHILLS: 0
FEVER: 0
SORE THROAT: 1
COUGH: 1

## 2025-02-11 NOTE — PROGRESS NOTES
CHIEF COMPLAINT  Chief Complaint   Patient presents with    Runny Nose     Cough w mucus, fever, headache x2d     Subjective:   Velvet Mota is a 24 y.o. female who presents to urgent care with concerns for sore throat, runny nose, cough and congestion x 2 days.  Patient was reports associated symptoms of headache and subjective fever.  She reports attempting to alleviate symptoms with OTC cold and flu medications as well as OTC analgesics.  Denies any symptoms of nausea, vomiting or diarrhea.  Does report adequate intake.  No other pertinent past medical history.       Review of Systems   Constitutional:  Negative for chills and fever.   HENT:  Positive for congestion and sore throat.    Respiratory:  Positive for cough.        PAST MEDICAL HISTORY  Patient Active Problem List    Diagnosis Date Noted    Indication for care in labor or delivery 02/15/2024    Labor and delivery indication for care or intervention 08/26/2022       SURGICAL HISTORY   has a past surgical history that includes primary c section (Bilateral, 8/27/2022) and repeat c sectoin with salpingectomy (N/A, 2/16/2024).    ALLERGIES  Allergies   Allergen Reactions    Cephalosporins Rash    Rocephin [Ceftriaxone] Rash     rash       CURRENT MEDICATIONS  Home Medications       Reviewed by Michi Perry Ass't (Medical Assistant) on 02/11/25 at 1524  Med List Status: <None>     Medication Last Dose Status   cetirizine (ZYRTEC ALLERGY) 10 MG Tab Not Taking Active   fluticasone (FLONASE) 50 MCG/ACT nasal spray Not Taking Active   omeprazole (PRILOSEC) 20 MG delayed-release capsule Not Taking Active   Prenatal Vit-Fe Fumarate-FA (PRENATAL VITAMIN PO) Not Taking Active                    SOCIAL HISTORY  Social History     Tobacco Use    Smoking status: Never     Passive exposure: Never    Smokeless tobacco: Never   Vaping Use    Vaping status: Never Used   Substance and Sexual Activity    Alcohol use: Not Currently    Drug use:  "Not Currently     Types: Inhaled     Comment: HX THC not over past year 8/26/22    Sexual activity: Not on file       FAMILY HISTORY  Family History   Problem Relation Age of Onset    Multiple myeloma Mother     Pancreatic Cancer Father     Scoliosis Brother          Medications, Allergies, and current problem list reviewed today in Epic.     Objective:     BP 98/78 (BP Location: Right arm, Patient Position: Sitting, BP Cuff Size: Adult)   Pulse 94   Temp 36.2 °C (97.2 °F) (Temporal)   Resp 16   Ht 1.575 m (5' 2\")   Wt 92.9 kg (204 lb 14.4 oz)   SpO2 95%     Physical Exam  Vitals reviewed.   Constitutional:       General: She is not in acute distress.     Appearance: Normal appearance. She is not ill-appearing or toxic-appearing.   HENT:      Head: Normocephalic.      Right Ear: Tympanic membrane normal.      Left Ear: Tympanic membrane normal.      Nose: Congestion present.      Mouth/Throat:      Mouth: Mucous membranes are moist.      Pharynx: Oropharynx is clear. Posterior oropharyngeal erythema present.   Cardiovascular:      Rate and Rhythm: Normal rate and regular rhythm.      Pulses: Normal pulses.      Heart sounds: Normal heart sounds.   Pulmonary:      Effort: Pulmonary effort is normal. No respiratory distress.      Breath sounds: Normal breath sounds. No stridor. No wheezing, rhonchi or rales.   Musculoskeletal:      Cervical back: Normal range of motion and neck supple. No tenderness.   Skin:     General: Skin is warm.   Neurological:      General: No focal deficit present.      Mental Status: She is alert.   Psychiatric:         Mood and Affect: Mood normal.       Lab Results/POC Test Results   Results for orders placed or performed in visit on 02/11/25   POCT GROUP A STREP, PCR    Collection Time: 02/11/25  3:58 PM   Result Value Ref Range    POC Group A Strep, PCR Not Detected Not Detected, Invalid             Assessment/Plan:     Diagnosis and associated orders:     1. Pharyngitis, " unspecified etiology  POCT GROUP A STREP, PCR    POCT CoV-2, Flu A/B, RSV by PCR      2. Viral URI with cough           Comments/MDM:     The patient presents with symptoms suspicious for likely viral upper respiratory infection. Differential includes bacterial pneumonia, sinusitis, allergic rhinitis.  Patient is nontoxic appearing and not in need of emergent medical intervention. They have a normal pulse oximetry on room air, afebrile, and a normal pulmonary exam. Overall, the patient is very well appearing. I do not feel that this patient would benefit from antibiotics at this time.   Recommended symptomatic and supportive care at this time that includes plenty of fluids, rest, Tylenol/Ibuprofen for pain/fever, warm salt water gargles for sore throat, OTC cough and decongestant medication, Flonase, nasal saline washes.    Return to clinic if symptoms worsen or fail to resolve.         Differential diagnosis, natural history, supportive care, and indications for immediate follow-up discussed.    Advised the patient to follow-up with the primary care physician for recheck, reevaluation, and consideration of further management.    Please note that this dictation was created using voice recognition software. I have made a reasonable attempt to correct obvious errors, but I expect that there are errors of grammar and possibly content that I did not discover before finalizing the note.    This note was electronically signed by CHER Coppola

## 2025-05-09 ENCOUNTER — APPOINTMENT (OUTPATIENT)
Dept: MEDICAL GROUP | Facility: CLINIC | Age: 25
End: 2025-05-09
Payer: COMMERCIAL

## 2025-05-09 VITALS
HEART RATE: 79 BPM | HEIGHT: 62 IN | WEIGHT: 211.8 LBS | DIASTOLIC BLOOD PRESSURE: 80 MMHG | OXYGEN SATURATION: 97 % | BODY MASS INDEX: 38.98 KG/M2 | TEMPERATURE: 97.9 F | SYSTOLIC BLOOD PRESSURE: 112 MMHG

## 2025-05-09 DIAGNOSIS — Z23 NEED FOR VACCINATION: ICD-10-CM

## 2025-05-09 DIAGNOSIS — Z13.79 GENETIC SCREENING: ICD-10-CM

## 2025-05-09 DIAGNOSIS — F90.9 ATTENTION DEFICIT HYPERACTIVITY DISORDER (ADHD), UNSPECIFIED ADHD TYPE: ICD-10-CM

## 2025-05-09 DIAGNOSIS — Z13.228 SCREENING FOR METABOLIC DISORDER: ICD-10-CM

## 2025-05-09 DIAGNOSIS — R45.4 ANGER: ICD-10-CM

## 2025-05-09 DIAGNOSIS — R06.83 SNORING: ICD-10-CM

## 2025-05-09 DIAGNOSIS — F41.9 ANXIETY: ICD-10-CM

## 2025-05-09 DIAGNOSIS — Z72.820 POOR SLEEP: ICD-10-CM

## 2025-05-09 PROCEDURE — 99213 OFFICE O/P EST LOW 20 MIN: CPT

## 2025-05-09 PROCEDURE — 3074F SYST BP LT 130 MM HG: CPT

## 2025-05-09 PROCEDURE — 3079F DIAST BP 80-89 MM HG: CPT

## 2025-05-09 RX ORDER — DULOXETIN HYDROCHLORIDE 30 MG/1
30 CAPSULE, DELAYED RELEASE ORAL DAILY
Qty: 30 CAPSULE | Refills: 2 | Status: SHIPPED | OUTPATIENT
Start: 2025-05-09

## 2025-05-09 ASSESSMENT — ANXIETY QUESTIONNAIRES
7. FEELING AFRAID AS IF SOMETHING AWFUL MIGHT HAPPEN: NOT AT ALL
3. WORRYING TOO MUCH ABOUT DIFFERENT THINGS: NOT AT ALL
IF YOU CHECKED OFF ANY PROBLEMS ON THIS QUESTIONNAIRE, HOW DIFFICULT HAVE THESE PROBLEMS MADE IT FOR YOU TO DO YOUR WORK, TAKE CARE OF THINGS AT HOME, OR GET ALONG WITH OTHER PEOPLE: SOMEWHAT DIFFICULT
1. FEELING NERVOUS, ANXIOUS, OR ON EDGE: NOT AT ALL
2. NOT BEING ABLE TO STOP OR CONTROL WORRYING: NEARLY EVERY DAY
5. BEING SO RESTLESS THAT IT IS HARD TO SIT STILL: MORE THAN HALF THE DAYS
6. BECOMING EASILY ANNOYED OR IRRITABLE: NEARLY EVERY DAY
4. TROUBLE RELAXING: SEVERAL DAYS
1. FEELING NERVOUS, ANXIOUS, OR ON EDGE: NOT AT ALL
GAD7 TOTAL SCORE: 9

## 2025-05-09 ASSESSMENT — PATIENT HEALTH QUESTIONNAIRE - PHQ9: CLINICAL INTERPRETATION OF PHQ2 SCORE: 0

## 2025-05-09 NOTE — PROGRESS NOTES
"[facility]     PATIENT ID:  NAME:  Velvet Mota  MRN:               [id number]  YOB: 2000    Date: 8:18 AM      Fellow: Donato Mills M.D.    CC:  Establish with a new provider      HPI: Velvet Mota is a 25 y.o. female who presented to [facility] with a new provider. She reports that she is approximately 1 year postpartum from her last pregnancy and has 2 children under 2 years old. She reports a longstanding history of feeling that she has a short fuse (quick to anger), difficulty with some sleep and has been using marijuana to help her sleep and wakes feeling unrested. Notes snoring and fatigue throughout the day. She states she was diagnosed with ADHD in the past and was on medication for this and would like to restart it before returning to finish school. 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 She denies any recent fevers, chills, chest pain, shortness of breath, abdominal pain, nausea, vomiting, headache, lightheadedness, dizziness, weakness, lateralizing symptoms, trauma, injury or falls.           5/9/2025     8:30 AM   PHQ-9 Screening   Little interest or pleasure in doing things 0 - not at all   Feeling down, depressed, or hopeless 0 - not at all   PHQ-2 Total Score 0       Interpretation of PHQ-9 Total Score   Score Severity   1-4 No Depression   5-9 Mild Depression   10-14 Moderate Depression   15-19 Moderately Severe Depression   20-27 Severe Depression        5/9/2025     8:58 AM 5/9/2025     8:56 AM    EDWIGE-7 ANXIETY SCALE FLOWSHEET   Feeling nervous, anxious, or on edge 0 0   Not being able to stop or control worrying 3    Worrying too much about different things 0    Trouble relaxing 1    Being so restless that it is hard to sit still 2    Becoming easily annoyed or irritable 3    Feeling afraid as if something " awful might happen 0    EDWIGE-7 Total Score 9    How difficult have these problems made it for you to do your work, take care of things at home, or get along with other people? Somewhat difficult        No problems updated.    REVIEW OF SYSTEMS:   Ten systems reviewed and were negative except as noted in the HPI.                PROBLEM LIST  Patient Active Problem List   Diagnosis    Labor and delivery indication for care or intervention    Indication for care in labor or delivery        PAST SURGICAL HISTORY:  Past Surgical History:   Procedure Laterality Date    REPEAT C SECTOIN WITH SALPINGECTOMY N/A 2024    Procedure: REPEAT  SECTION WITH BILATERAL SALPINGECTOMY;  Surgeon: Ray Valentin M.D.;  Location: [facility];  Service: Labor and Delivery    PRIMARY C SECTION Bilateral 2022    Procedure:  SECTION, PRIMARY;  Surgeon: Elsa Lane M.D.;  Location: [facility];  Service: Labor and Delivery       FAMILY HISTORY:  Family History   Problem Relation Age of Onset    Multiple myeloma Mother     Pancreatic Cancer Father     Scoliosis Brother        SOCIAL HISTORY:   Social History     Tobacco Use    Smoking status: Never     Passive exposure: Never    Smokeless tobacco: Never   Substance Use Topics    Alcohol use: Not Currently       ALLERGIES:  Allergies   Allergen Reactions    Cephalosporins Rash    Rocephin [Ceftriaxone] Rash     rash       OUTPATIENT MEDICATIONS:    Current Outpatient Medications:     DULoxetine (CYMBALTA) 30 MG Cap DR Particles, Take 1 Capsule by mouth every day., Disp: 30 Capsule, Rfl: 2    fluticasone (FLONASE) 50 MCG/ACT nasal spray, Administer 1 Spray into affected nostril(S) every day. (Patient not taking: Reported on 2025), Disp: 16 g, Rfl: 0    cetirizine (ZYRTEC ALLERGY) 10 MG Tab, Take 1 Tablet by mouth every day. (Patient not taking: Reported on 2025), Disp: 30 Tablet, Rfl: 0    Prenatal Vit-Fe Fumarate-FA (PRENATAL  "VITAMIN PO), Take 1 Tablet by mouth every day. (Patient not taking: Reported on 12/5/2024), Disp: , Rfl:     omeprazole (PRILOSEC) 20 MG delayed-release capsule, Take 20 mg by mouth every day. (Patient not taking: Reported on 12/5/2024), Disp: , Rfl:     PHYSICAL EXAM:  Vitals:    05/09/25 0818   BP: 112/80   BP Location: Right arm   Patient Position: Sitting   BP Cuff Size: Adult   Pulse: 79   Temp: 36.6 °C (97.9 °F)   TempSrc: Temporal   SpO2: 97%   Weight: 96.1 kg (211 lb 12.8 oz)   Height: 1.575 m (5' 2\")       General: Pt resting in NAD, pleasant and cooperative   Skin:  Pink, warm and dry.  HEENT: NC/AT. EOMI. PERRLA, no anterior neck mass or lymphadenopathy  Lungs:  Symmetrical.  CTAB, good air movement, no adventitious sounds  Cardiovascular:  S1/S2 RRR, no murmurs rubs or gallops, no lower extremity swelling  Abdomen:  Abdomen is soft, nontender, obese, no distention, no peritoneal signs  Extremities:  Full range of motion.  CNS:  Muscle tone is normal. No gross focal neurologic deficits      ASSESSMENT/PLAN: reports that she is approximately 1 year postpartum from her last pregnancy and has 2 children under 2 years old. She reports a longstanding history of feeling that she has a short fuse (quick to anger), difficulty with some sleep and has been using marijuana to help her sleep and wakes feeling unrested. Notes snoring and fatigue throughout the day. She states she was diagnosed with ADHD in the past and was on medication for this and would like to restart it before returning to finish school. 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 She denies any recent fevers, chills, chest pain, shortness of breath, abdominal pain, nausea, vomiting, headache, lightheadedness, dizziness, weakness, lateralizing symptoms, trauma, injury or falls. On examination patient appears grossly " "normal as above. Counseled patient on anxiety as EDWIGE 7 is slightly elevated, she is interested in starting psychotherapy. I will have the patient schedule for initial psychotherapy session with Dr. Petersen at this clinic. Hx of ADHD, and was on medication in the past but stopped when she was 16 due to her father passing away and family did not continue her appointments with psychiatry. Patient states she does not believe she will be able to get records from her prior provider so she would like to establish with a local psychiatrist, referral will be placed at this time. Counseled on sleep hygiene. Patient states she would like to start medication at this time. Duloxetine 30mg PO daily prescribed given anxiety predisposition and inability to \"stop her mind at bedtime\". No SI/HI. Will order baseline metabolic and genetic lab testing as patient is interested in [facility] [place] [facility].     Differential diagnosis, natural history, supportive care, and indications for immediate follow-up discussed.    Advised the patient to follow-up in clinic for recheck, reevaluation, and consideration of further management. If acute concerns or red flag symptoms go to the ED for acute evaluation.     Patient left prior to vaccinations given, will give at follow up appointment.     Will follow up for continued mental health care and well woman exam for cervical cancer screening.     Problem List Items Addressed This Visit    None  Visit Diagnoses         Need for vaccination        Relevant Orders    Hepatitis B Vaccine Adult 20+    9VHPV Vaccine 2-3 Dose (GARDASIL 9)      Snoring        Relevant Orders    Referral to Pulmonary and Sleep Medicine      Poor sleep        Relevant Orders    Referral to Pulmonary and Sleep Medicine      Attention deficit hyperactivity disorder (ADHD), unspecified ADHD type        Relevant Orders    Referral to Behavioral Health      Screening for metabolic disorder        Relevant Orders    Comp " Metabolic Panel    TSH WITH REFLEX TO FT4    Lipid Profile      Genetic screening        Relevant Orders    Comp Metabolic Panel    CBC WITH DIFFERENTIAL    Referral to Genetic Research Studies      Anxiety        Relevant Medications    DULoxetine (CYMBALTA) 30 MG Cap DR Particles    Other Relevant Orders    Referral to Behavioral Health      Anger        Relevant Medications    DULoxetine (CYMBALTA) 30 MG Cap DR Particles    Other Relevant Orders    Referral to Behavioral Health            Donato Mills M.D.  PGY-4, Wilderness Fellow  [facility]

## 2025-05-13 ENCOUNTER — OFFICE VISIT (OUTPATIENT)
Dept: MEDICAL GROUP | Facility: CLINIC | Age: 25
End: 2025-05-13
Payer: COMMERCIAL

## 2025-05-13 DIAGNOSIS — F34.1 DYSTHYMIA: ICD-10-CM

## 2025-05-13 PROCEDURE — 90834 PSYTX W PT 45 MINUTES: CPT | Performed by: PSYCHOLOGIST

## 2025-05-13 NOTE — PROGRESS NOTES
[facility]  Psychotherapy Consult      Patient Name: Velvet Mota  Patient MRN: [id number]  Today's Date:  25    Type of session: intake assessment  Session start time: 8  Session stop time: 8:45  Length of time spent face to face with patient: 45  Persons in attendance: patient    Diagnoses:   1. Dysthymia         Pt  states she feels she is stuck, at a dead end.  Mother of two small children.   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  Financially secure, owns her home, happily .      Mother  of cancer 2 years ago and she misses her and grieves her very much.    Close to grandparents, feels very grateful for them.  Also lost her father, in addition to her mother.      Feels grateful for her life, yet is grieving and sad about losses of her parents and also of the life pre-kids.    Dreams include having her kids be happy and do well in life.  Prior to being a mom she wanted to travel and explore, played soccer, took her dogs out.    Not connected to a spiritual belief system, but feels a private connection to a higher being and to nature.    No history of  care.    No SI.  IRON:  Marijuana to help with stress, feels it is not a problem.    Interested in psychotherapy, referred her to [facility], [facility].  And can f/u with this writer in the future for support/consultation.      Gwendolyn Pro, Ph.D.

## 2025-05-14 NOTE — Clinical Note
REFERRAL APPROVAL NOTICE         Sent on May 14, 2025                   Velvet Mota  [address]   [address]  [place] [address]                   Dear Ms. Lucie Mota,    After a careful review of the medical information and benefit coverage, [facility] has processed your referral. See below for additional details.    If applicable, you must be actively enrolled with your insurance for coverage of the authorized service. If you have any questions regarding your coverage, please contact your insurance directly.    REFERRAL INFORMATION   Referral #:  [id number]  Referred-To Department    Referred-By Provider:  Behavioral Health    Donato Mills M.D.   Behavioral Health Outpatient      [address]  [place] [address]  [phone] [address]  [place] [id number] [phone]    Referral Start Date:  05/09/2025  Referral End Date:   05/09/2026             SCHEDULING  If you do not already have an appointment, please call [phone] to make an appointment.     MORE INFORMATION  If you do not already have a [facility] account, sign up at: [email or web address]  You can access your medical information, make appointments, see lab results, billing information, and more.  If you have questions regarding this referral, please contact  the [facility] Referrals department at:             [phone]. Monday - Friday 8:00AM - 5:00PM.     Sincerely,    [facility]

## 2025-05-19 ENCOUNTER — TELEPHONE (OUTPATIENT)
Dept: HEALTH INFORMATION MANAGEMENT | Facility: OTHER | Age: 25
End: 2025-05-19
Payer: COMMERCIAL

## 2025-06-06 ENCOUNTER — APPOINTMENT (OUTPATIENT)
Dept: MEDICAL GROUP | Facility: CLINIC | Age: 25
End: 2025-06-06
Payer: COMMERCIAL

## (undated) DEVICE — WATER IRRIGATION STERILE 1000ML (12EA/CA)

## (undated) DEVICE — TRAY SPINAL ANESTHESIA NON-SAFETY (10/CA)

## (undated) DEVICE — TAPE CLOTH MEDIPORE 6 INCH - (12RL/CA)

## (undated) DEVICE — CATHETER IV NON-SAFETY 18 GA X 1 1/4 (50/BX 4BX/CA)

## (undated) DEVICE — GLOVE BIOGEL INDICATOR SZ 7SURGICAL PF LTX - (50/BX 4BX/CA)

## (undated) DEVICE — PAD GROUNDING PRE-JELLED - (50EA/PK)

## (undated) DEVICE — SUTURE 0 VICRYL PLUS CT-1 - 36 INCH (36/BX)

## (undated) DEVICE — PACK ROOM TURNOVER L&D (12/CA)

## (undated) DEVICE — KIT  I.V. START (100EA/CA)

## (undated) DEVICE — PACK C-SECTION (2EA/CA)

## (undated) DEVICE — STAPLER SKIN DISP - (6/BX 10BX/CA) VISISTAT

## (undated) DEVICE — HEAD HOLDER JUNIOR/ADULT

## (undated) DEVICE — SUTURE 4-0 VICRYL PLUSFS-1 - 27 INCH (36/BX)

## (undated) DEVICE — RETRACTOR O C SECTION LRY - (5/BX)

## (undated) DEVICE — SLEEVE, SEQUENTIAL CALF REG

## (undated) DEVICE — SUTURE 0 CHROMIC CT-1 - (36/BX)

## (undated) DEVICE — SET EXTENSION WITH 2 PORTS (48EA/CA) ***PART #2C8610 IS A SUBSTITUTE*****

## (undated) DEVICE — CANISTER SUCTION 3000ML MECHANICAL FILTER AUTO SHUTOFF MEDI-VAC NONSTERILE LF DISP  (40EA/CA)

## (undated) DEVICE — CHLORAPREP 26 ML APPLICATOR - ORANGE TINT(25/CA)

## (undated) DEVICE — TUBING CLEARLINK DUO-VENT - C-FLO (48EA/CA)

## (undated) DEVICE — SODIUM CHL IRRIGATION 0.9% 1000ML (12EA/CA)

## (undated) DEVICE — BLANKET UNDERBODY FULL ACCES - (5/CA)

## (undated) DEVICE — CLOSURE SKIN STRIP 1/2 X 4 IN - (STERI STRIP) (50/BX 4BX/CA)

## (undated) DEVICE — ELECTRODE DUAL RETURN W/ CORD - (50/PK)

## (undated) DEVICE — GLOVE BIOGEL SZ 6.5 SURGICAL PF LTX (50PR/BX 4BX/CA)

## (undated) DEVICE — SUTURE 0 36 CHROMIC GUT CTX (36PK/BX)

## (undated) DEVICE — SUTURE 3-0 VICRYL PLUS CT-1 - 36 INCH (36/BX)

## (undated) DEVICE — GLOVE BIOGEL SZ 7 SURGICAL PF LTX - (50PR/BX 4BX/CA)